# Patient Record
Sex: FEMALE | Race: BLACK OR AFRICAN AMERICAN | NOT HISPANIC OR LATINO | Employment: FULL TIME | URBAN - NONMETROPOLITAN AREA
[De-identification: names, ages, dates, MRNs, and addresses within clinical notes are randomized per-mention and may not be internally consistent; named-entity substitution may affect disease eponyms.]

---

## 2017-02-20 RX ORDER — BUTALBITAL, ACETAMINOPHEN AND CAFFEINE 50; 325; 40 MG/1; MG/1; MG/1
TABLET ORAL
Qty: 60 TABLET | Refills: 0 | OUTPATIENT
Start: 2017-02-20

## 2017-05-01 RX ORDER — METHOCARBAMOL 750 MG/1
TABLET, FILM COATED ORAL
Qty: 90 TABLET | Refills: 5 | Status: SHIPPED | OUTPATIENT
Start: 2017-05-01 | End: 2018-05-14

## 2017-05-11 ENCOUNTER — OFFICE VISIT (OUTPATIENT)
Dept: FAMILY MEDICINE CLINIC | Facility: CLINIC | Age: 42
End: 2017-05-11

## 2017-05-11 VITALS
BODY MASS INDEX: 27 KG/M2 | SYSTOLIC BLOOD PRESSURE: 110 MMHG | DIASTOLIC BLOOD PRESSURE: 78 MMHG | HEIGHT: 67 IN | WEIGHT: 172 LBS

## 2017-05-11 DIAGNOSIS — F41.9 ANXIETY: ICD-10-CM

## 2017-05-11 DIAGNOSIS — Z00.00 GENERAL MEDICAL EXAM: Primary | ICD-10-CM

## 2017-05-11 DIAGNOSIS — Z12.31 SCREENING MAMMOGRAM, ENCOUNTER FOR: ICD-10-CM

## 2017-05-11 DIAGNOSIS — R51.9 HEADACHE DISORDER: ICD-10-CM

## 2017-05-11 DIAGNOSIS — G47.09 OTHER INSOMNIA: ICD-10-CM

## 2017-05-11 PROBLEM — G47.00 INSOMNIA: Status: ACTIVE | Noted: 2017-05-11

## 2017-05-11 PROCEDURE — 99396 PREV VISIT EST AGE 40-64: CPT | Performed by: NURSE PRACTITIONER

## 2017-05-11 RX ORDER — ZOLPIDEM TARTRATE 5 MG/1
5 TABLET ORAL NIGHTLY PRN
Qty: 30 TABLET | Refills: 2 | Status: SHIPPED | OUTPATIENT
Start: 2017-05-11 | End: 2018-03-15 | Stop reason: SDUPTHER

## 2017-05-11 RX ORDER — NYSTATIN AND TRIAMCINOLONE ACETONIDE 100000; 1 [USP'U]/G; MG/G
OINTMENT TOPICAL 2 TIMES DAILY
Qty: 30 G | Refills: 5 | Status: SHIPPED | OUTPATIENT
Start: 2017-05-11 | End: 2018-09-20

## 2017-05-12 ENCOUNTER — APPOINTMENT (OUTPATIENT)
Dept: LAB | Facility: HOSPITAL | Age: 42
End: 2017-05-12

## 2017-05-12 LAB
25(OH)D3 SERPL-MCNC: 42.3 NG/ML (ref 30–100)
ALBUMIN SERPL-MCNC: 4.2 G/DL (ref 3.4–4.8)
ALBUMIN/GLOB SERPL: 1.2 G/DL (ref 1.1–1.8)
ALP SERPL-CCNC: 81 U/L (ref 38–126)
ALT SERPL W P-5'-P-CCNC: 21 U/L (ref 9–52)
ANION GAP SERPL CALCULATED.3IONS-SCNC: 10 MMOL/L (ref 5–15)
ARTICHOKE IGE QN: 92 MG/DL (ref 1–129)
AST SERPL-CCNC: 35 U/L (ref 14–36)
BASOPHILS # BLD AUTO: 0.04 10*3/MM3 (ref 0–0.2)
BASOPHILS NFR BLD AUTO: 0.6 % (ref 0–2)
BILIRUB SERPL-MCNC: 0.7 MG/DL (ref 0.2–1.3)
BUN BLD-MCNC: 13 MG/DL (ref 7–21)
BUN/CREAT SERPL: 13.7 (ref 7–25)
CALCIUM SPEC-SCNC: 9 MG/DL (ref 8.4–10.2)
CHLORIDE SERPL-SCNC: 105 MMOL/L (ref 95–110)
CHOLEST SERPL-MCNC: 178 MG/DL (ref 0–199)
CO2 SERPL-SCNC: 24 MMOL/L (ref 22–31)
CREAT BLD-MCNC: 0.95 MG/DL (ref 0.5–1)
DEPRECATED RDW RBC AUTO: 42.7 FL (ref 36.4–46.3)
EOSINOPHIL # BLD AUTO: 0.17 10*3/MM3 (ref 0–0.7)
EOSINOPHIL NFR BLD AUTO: 2.4 % (ref 0–7)
ERYTHROCYTE [DISTWIDTH] IN BLOOD BY AUTOMATED COUNT: 13.1 % (ref 11.5–14.5)
GFR SERPL CREATININE-BSD FRML MDRD: 77 ML/MIN/1.73 (ref 58–135)
GLOBULIN UR ELPH-MCNC: 3.6 GM/DL (ref 2.3–3.5)
GLUCOSE BLD-MCNC: 96 MG/DL (ref 60–100)
HBA1C MFR BLD: 5.43 % (ref 4–5.6)
HCT VFR BLD AUTO: 43.3 % (ref 35–45)
HDLC SERPL-MCNC: 59 MG/DL (ref 60–200)
HGB BLD-MCNC: 15 G/DL (ref 12–15.5)
IMM GRANULOCYTES # BLD: 0.01 10*3/MM3 (ref 0–0.02)
IMM GRANULOCYTES NFR BLD: 0.1 % (ref 0–0.5)
LDLC/HDLC SERPL: 1.76 {RATIO} (ref 0–3.22)
LYMPHOCYTES # BLD AUTO: 1.59 10*3/MM3 (ref 0.6–4.2)
LYMPHOCYTES NFR BLD AUTO: 22.3 % (ref 10–50)
MAGNESIUM SERPL-MCNC: 2.2 MG/DL (ref 1.6–2.3)
MCH RBC QN AUTO: 30.8 PG (ref 26.5–34)
MCHC RBC AUTO-ENTMCNC: 34.6 G/DL (ref 31.4–36)
MCV RBC AUTO: 88.9 FL (ref 80–98)
MONOCYTES # BLD AUTO: 0.58 10*3/MM3 (ref 0–0.9)
MONOCYTES NFR BLD AUTO: 8.1 % (ref 0–12)
NEUTROPHILS # BLD AUTO: 4.73 10*3/MM3 (ref 2–8.6)
NEUTROPHILS NFR BLD AUTO: 66.5 % (ref 37–80)
PLATELET # BLD AUTO: 248 10*3/MM3 (ref 150–450)
PMV BLD AUTO: 10.7 FL (ref 8–12)
POTASSIUM BLD-SCNC: 4 MMOL/L (ref 3.5–5.1)
PROT SERPL-MCNC: 7.8 G/DL (ref 6.3–8.6)
RBC # BLD AUTO: 4.87 10*6/MM3 (ref 3.77–5.16)
SODIUM BLD-SCNC: 139 MMOL/L (ref 137–145)
TRIGL SERPL-MCNC: 77 MG/DL (ref 20–199)
TSH SERPL DL<=0.05 MIU/L-ACNC: 0.78 MIU/ML (ref 0.46–4.68)
VIT B12 BLD-MCNC: 645 PG/ML (ref 239–931)
WBC NRBC COR # BLD: 7.12 10*3/MM3 (ref 3.2–9.8)

## 2017-05-12 PROCEDURE — 85025 COMPLETE CBC W/AUTO DIFF WBC: CPT | Performed by: NURSE PRACTITIONER

## 2017-05-12 PROCEDURE — 80053 COMPREHEN METABOLIC PANEL: CPT | Performed by: NURSE PRACTITIONER

## 2017-05-12 PROCEDURE — 80061 LIPID PANEL: CPT | Performed by: NURSE PRACTITIONER

## 2017-05-12 PROCEDURE — 83735 ASSAY OF MAGNESIUM: CPT | Performed by: NURSE PRACTITIONER

## 2017-05-12 PROCEDURE — 82306 VITAMIN D 25 HYDROXY: CPT | Performed by: NURSE PRACTITIONER

## 2017-05-12 PROCEDURE — 84443 ASSAY THYROID STIM HORMONE: CPT | Performed by: NURSE PRACTITIONER

## 2017-05-12 PROCEDURE — 83036 HEMOGLOBIN GLYCOSYLATED A1C: CPT | Performed by: NURSE PRACTITIONER

## 2017-05-12 PROCEDURE — 82607 VITAMIN B-12: CPT | Performed by: NURSE PRACTITIONER

## 2017-05-12 PROCEDURE — 36415 COLL VENOUS BLD VENIPUNCTURE: CPT | Performed by: NURSE PRACTITIONER

## 2017-06-20 RX ORDER — FLUCONAZOLE 150 MG/1
TABLET ORAL
Qty: 3 TABLET | Refills: 0 | Status: SHIPPED | OUTPATIENT
Start: 2017-06-20 | End: 2018-04-26 | Stop reason: SDUPTHER

## 2017-06-28 ENCOUNTER — TELEPHONE (OUTPATIENT)
Dept: FAMILY MEDICINE CLINIC | Facility: CLINIC | Age: 42
End: 2017-06-28

## 2017-06-28 NOTE — TELEPHONE ENCOUNTER
Marissa had left a message on my voice mail yesterday saying that she needed a refill on the cleocin ? That you usually give her for bacterial infections. The refills had  .

## 2017-07-03 ENCOUNTER — TELEPHONE (OUTPATIENT)
Dept: FAMILY MEDICINE CLINIC | Facility: CLINIC | Age: 42
End: 2017-07-03

## 2017-07-03 NOTE — TELEPHONE ENCOUNTER
[facility] HAS CALLED BACK ON PRESP FOR RINA MAYS..GIVEN 6/28..   CLINDAMYCIN SUPPOSITORIES WAS INSERT  2XDAY BUT THE QUANITY WAS FOR ONLY 3??PLEASE CALL PHARM BACK

## 2017-07-05 NOTE — TELEPHONE ENCOUNTER
[facility] CALLED AGAIN ON THE PRESP FOR RINA MAYS'S PRESP .   DOSEING ON IT WILL NOT GO THRU THE INSURANCE..THEY NEED TO SPEAK WITH YOU AGAIN TO GET THIS CHANGED

## 2017-08-11 RX ORDER — ELETRIPTAN HYDROBROMIDE 40 MG/1
TABLET, FILM COATED ORAL
Qty: 12 TABLET | Refills: 0 | Status: SHIPPED | OUTPATIENT
Start: 2017-08-11 | End: 2017-09-15 | Stop reason: SDUPTHER

## 2017-09-18 RX ORDER — ELETRIPTAN HYDROBROMIDE 40 MG/1
TABLET, FILM COATED ORAL
Qty: 12 TABLET | Refills: 5 | Status: SHIPPED | OUTPATIENT
Start: 2017-09-18 | End: 2018-03-15 | Stop reason: SDUPTHER

## 2017-11-15 RX ORDER — ALBUTEROL SULFATE 90 MCG
HFA AEROSOL WITH ADAPTER (GRAM) INHALATION
Qty: 6.7 G | Refills: 5 | Status: SHIPPED | OUTPATIENT
Start: 2017-11-15 | End: 2018-12-14 | Stop reason: SDUPTHER

## 2017-11-16 ENCOUNTER — TELEPHONE (OUTPATIENT)
Dept: FAMILY MEDICINE CLINIC | Facility: CLINIC | Age: 42
End: 2017-11-16

## 2017-11-27 ENCOUNTER — OFFICE VISIT (OUTPATIENT)
Dept: FAMILY MEDICINE CLINIC | Facility: CLINIC | Age: 42
End: 2017-11-27

## 2017-11-27 VITALS
SYSTOLIC BLOOD PRESSURE: 118 MMHG | DIASTOLIC BLOOD PRESSURE: 78 MMHG | WEIGHT: 183 LBS | OXYGEN SATURATION: 98 % | HEART RATE: 47 BPM | TEMPERATURE: 97.8 F | HEIGHT: 67 IN | BODY MASS INDEX: 28.72 KG/M2

## 2017-11-27 DIAGNOSIS — J06.9 ACUTE UPPER RESPIRATORY INFECTION: Primary | ICD-10-CM

## 2017-11-27 DIAGNOSIS — R51.9 HEADACHE DISORDER: ICD-10-CM

## 2017-11-27 PROCEDURE — 96372 THER/PROPH/DIAG INJ SC/IM: CPT | Performed by: NURSE PRACTITIONER

## 2017-11-27 PROCEDURE — 99213 OFFICE O/P EST LOW 20 MIN: CPT | Performed by: NURSE PRACTITIONER

## 2017-11-27 RX ORDER — FLUCONAZOLE 150 MG/1
150 TABLET ORAL ONCE
Qty: 2 TABLET | Refills: 0 | Status: SHIPPED | OUTPATIENT
Start: 2017-11-27 | End: 2017-11-27

## 2017-11-27 RX ORDER — PROMETHAZINE HYDROCHLORIDE AND CODEINE PHOSPHATE 6.25; 1 MG/5ML; MG/5ML
5 SYRUP ORAL EVERY 4 HOURS PRN
Qty: 180 ML | Refills: 0 | Status: SHIPPED | OUTPATIENT
Start: 2017-11-27 | End: 2018-03-15

## 2017-11-27 RX ORDER — CEFPROZIL 500 MG/1
500 TABLET, FILM COATED ORAL 2 TIMES DAILY
Qty: 20 TABLET | Refills: 0 | Status: SHIPPED | OUTPATIENT
Start: 2017-11-27 | End: 2018-03-15

## 2017-11-27 RX ORDER — TRIAMCINOLONE ACETONIDE 40 MG/ML
80 INJECTION, SUSPENSION INTRA-ARTICULAR; INTRAMUSCULAR ONCE
Status: COMPLETED | OUTPATIENT
Start: 2017-11-27 | End: 2017-11-27

## 2017-11-27 RX ORDER — TOPIRAMATE 100 MG/1
100 TABLET, FILM COATED ORAL 2 TIMES DAILY
Qty: 60 TABLET | Refills: 11 | Status: SHIPPED | OUTPATIENT
Start: 2017-11-27 | End: 2017-11-27

## 2017-11-27 RX ADMIN — TRIAMCINOLONE ACETONIDE 80 MG: 40 INJECTION, SUSPENSION INTRA-ARTICULAR; INTRAMUSCULAR at 15:21

## 2017-11-27 NOTE — PROGRESS NOTES
Chief Complaint   Patient presents with   • Cough   • Nasal Congestion   • Headache     Subjective   Marissa Bunch is a 42 y.o. female.     Cough   This is a new problem. The problem has been gradually worsening. The cough is productive of sputum. Associated symptoms include headaches, myalgias, nasal congestion, postnasal drip and rhinorrhea. Pertinent negatives include no chest pain, chills, ear congestion, ear pain, fever, heartburn, hemoptysis, rash, sore throat, shortness of breath, sweats, weight loss or wheezing. The treatment provided mild relief. Her past medical history is significant for bronchitis. There is no history of asthma, bronchiectasis, COPD, emphysema, environmental allergies or pneumonia.   Headache    This is a recurrent problem. The current episode started more than 1 year ago. The problem occurs intermittently. The problem has been gradually worsening. Associated symptoms include back pain, coughing, insomnia, neck pain, rhinorrhea and sinus pressure. Pertinent negatives include no abdominal pain, dizziness, ear pain, fever, hearing loss, nausea, numbness, photophobia, seizures, sore throat, swollen glands, tingling, visual change, vomiting, weakness or weight loss. She has tried acetaminophen, antidepressants, beta blockers, Excedrin, darkened room, cold packs, ergotamines, ketorolac injections, oral narcotics, oxygen and triptans for the symptoms. The treatment provided mild relief. There is no history of cancer, cluster headaches, hypertension, immunosuppression, migraine headaches, migraines in the family, obesity, pseudotumor cerebri, recent head traumas, sinus disease or TMJ.   Neck Pain    This is a recurrent problem. The current episode started 1 to 4 weeks ago. The problem occurs constantly. The problem has been rapidly worsening. The quality of the pain is described as burning. The pain is at a severity of 4/10. The symptoms are aggravated by position. The pain is worse  during the day. Associated symptoms include headaches. Pertinent negatives include no chest pain, fever, leg pain, numbness, pain with swallowing, paresis, photophobia, syncope, tingling, trouble swallowing, visual change, weakness or weight loss. She has tried acetaminophen, bed rest, chiropractic manipulation, heat, home exercises, ice, muscle relaxants, neck support, NSAIDs and oral narcotics for the symptoms. The treatment provided mild relief.   Insomnia   This is a recurrent problem. The current episode started 1 to 4 weeks ago. The problem occurs constantly. The problem has been gradually worsening. Associated symptoms include arthralgias, congestion, coughing, fatigue, headaches, myalgias and neck pain. Pertinent negatives include no abdominal pain, chest pain, chills, diaphoresis, fever, joint swelling, nausea, numbness, rash, sore throat, swollen glands, urinary symptoms, vertigo, visual change, vomiting or weakness. Treatments tried: ambien  The treatment provided mild relief.        The following portions of the patient's history were reviewed and updated as appropriate: allergies, current medications, past social history and problem list.    Review of Systems   Constitutional: Positive for fatigue. Negative for chills, diaphoresis, fever and weight loss.   HENT: Positive for congestion, postnasal drip, rhinorrhea, sinus pain and sinus pressure. Negative for dental problem, drooling, ear discharge, ear pain, facial swelling, hearing loss, sore throat and trouble swallowing.    Eyes: Negative.  Negative for photophobia.   Respiratory: Positive for cough. Negative for hemoptysis, shortness of breath and wheezing.    Cardiovascular: Negative.  Negative for chest pain and syncope.   Gastrointestinal: Negative.  Negative for abdominal pain, heartburn, nausea and vomiting.   Endocrine: Negative.    Genitourinary: Negative.    Musculoskeletal: Positive for arthralgias, back pain, myalgias, neck pain and neck  "stiffness. Negative for gait problem and joint swelling.   Skin: Negative.  Negative for rash.   Allergic/Immunologic: Negative.  Negative for environmental allergies.   Neurological: Positive for headaches. Negative for dizziness, vertigo, tingling, tremors, seizures, syncope, speech difficulty, weakness, light-headedness and numbness.   Hematological: Negative.  Negative for adenopathy. Does not bruise/bleed easily.   Psychiatric/Behavioral: Positive for agitation, behavioral problems and sleep disturbance. The patient is nervous/anxious and has insomnia.        Objective   /78 (BP Location: Right arm, Patient Position: Sitting, Cuff Size: Large Adult)  Pulse (!) 47  Temp 97.8 °F (36.6 °C)  Ht 67\" (170.2 cm)  Wt 183 lb (83 kg)  SpO2 98%  BMI 28.66 kg/m2  Physical Exam   Constitutional: She is oriented to person, place, and time. She appears well-developed and well-nourished.   HENT:   Head: Normocephalic and atraumatic.   Mouth/Throat: Oropharyngeal exudate present.   Eyes: Conjunctivae and EOM are normal. Pupils are equal, round, and reactive to light. Right eye exhibits no discharge. Left eye exhibits no discharge. No scleral icterus.   Neck: Normal range of motion. Neck supple.   Cardiovascular: Normal rate, regular rhythm, normal heart sounds and normal pulses.    Pulmonary/Chest: Effort normal. No respiratory distress. She has no wheezes. She has no rales. Chest wall is not dull to percussion. She exhibits no mass, no tenderness, no bony tenderness, no laceration, no crepitus, no edema, no deformity, no swelling and no retraction. Right breast exhibits no inverted nipple, no mass, no nipple discharge, no skin change and no tenderness. Left breast exhibits no inverted nipple, no mass, no nipple discharge, no skin change and no tenderness. Breasts are symmetrical. There is no breast swelling.   Abdominal: Soft. Bowel sounds are normal. She exhibits no distension. There is no tenderness. "   Genitourinary: Rectum normal and vagina normal. No breast tenderness, discharge or bleeding. Pelvic exam was performed with patient supine. Uterus is not deviated, not enlarged, not fixed and not tender. Cervix exhibits no motion tenderness, no discharge and no friability. Right adnexum displays no mass. Left adnexum displays no mass.   Musculoskeletal: Normal range of motion.   Neurological: She is alert and oriented to person, place, and time. She has normal reflexes. She displays normal reflexes. No cranial nerve deficit. She exhibits normal muscle tone. Coordination normal.   Skin: Skin is warm and dry.   Psychiatric: She has a normal mood and affect.   Nursing note and vitals reviewed.      Assessment/Plan   Problem List Items Addressed This Visit        Respiratory    Acute upper respiratory infection - Primary    Relevant Medications    cefprozil (CEFZIL) 500 MG tablet       Other    Headache disorder           New Medications Ordered This Visit   Medications   • promethazine-codeine (PHENERGAN with CODEINE) 6.25-10 MG/5ML syrup     Sig: Take 5 mL by mouth Every 4 (Four) Hours As Needed for Cough.     Dispense:  180 mL     Refill:  0   • fluconazole (DIFLUCAN) 150 MG tablet     Sig: Take 1 tablet by mouth 1 (One) Time for 1 dose.     Dispense:  2 tablet     Refill:  0   • cefprozil (CEFZIL) 500 MG tablet     Sig: Take 1 tablet by mouth 2 (Two) Times a Day.     Dispense:  20 tablet     Refill:  0      fluids, rest, meds as directed, diet discussed, follow up if worsen     It's not just what you eat, but when you eat  Eat breakfast, and eat smaller meals throughout the day. A healthy breakfast can jumpstart your metabolism, while eating small, healthy meals (rather than the standard three large meals) keeps your energy up.   Avoid eating at night. Try to eat dinner earlier and fast for 14-16 hours until breakfast the next morning. Studies suggest that eating only when you’re most active and giving your  digestive system a long break each day may help to regulate weight.

## 2017-12-15 RX ORDER — VERAPAMIL HYDROCHLORIDE 240 MG/1
TABLET, FILM COATED, EXTENDED RELEASE ORAL
Qty: 60 TABLET | Refills: 5 | Status: SHIPPED | OUTPATIENT
Start: 2017-12-15 | End: 2018-12-14 | Stop reason: SDUPTHER

## 2018-03-15 ENCOUNTER — OFFICE VISIT (OUTPATIENT)
Dept: FAMILY MEDICINE CLINIC | Facility: CLINIC | Age: 43
End: 2018-03-15

## 2018-03-15 ENCOUNTER — PRIOR AUTHORIZATION (OUTPATIENT)
Dept: FAMILY MEDICINE CLINIC | Facility: CLINIC | Age: 43
End: 2018-03-15

## 2018-03-15 VITALS
BODY MASS INDEX: 28.56 KG/M2 | SYSTOLIC BLOOD PRESSURE: 120 MMHG | WEIGHT: 182 LBS | DIASTOLIC BLOOD PRESSURE: 82 MMHG | HEIGHT: 67 IN

## 2018-03-15 DIAGNOSIS — F51.01 PRIMARY INSOMNIA: ICD-10-CM

## 2018-03-15 DIAGNOSIS — N62 LARGE BREASTS: ICD-10-CM

## 2018-03-15 DIAGNOSIS — M54.2 CERVICAL PAIN (NECK): Primary | ICD-10-CM

## 2018-03-15 DIAGNOSIS — R51.9 HEADACHE DISORDER: ICD-10-CM

## 2018-03-15 PROCEDURE — 99213 OFFICE O/P EST LOW 20 MIN: CPT | Performed by: NURSE PRACTITIONER

## 2018-03-15 RX ORDER — ZOLPIDEM TARTRATE 5 MG/1
5 TABLET ORAL NIGHTLY PRN
Qty: 30 TABLET | Refills: 2 | Status: SHIPPED | OUTPATIENT
Start: 2018-03-15 | End: 2019-01-30 | Stop reason: DRUGHIGH

## 2018-03-15 RX ORDER — BUTALBITAL, ACETAMINOPHEN AND CAFFEINE 50; 325; 40 MG/1; MG/1; MG/1
1 TABLET ORAL EVERY 4 HOURS PRN
Qty: 60 TABLET | Refills: 0 | Status: SHIPPED | OUTPATIENT
Start: 2018-03-15 | End: 2019-01-30

## 2018-03-15 RX ORDER — ELETRIPTAN HYDROBROMIDE 40 MG/1
40 TABLET, FILM COATED ORAL DAILY PRN
Qty: 12 TABLET | Refills: 11 | Status: SHIPPED | OUTPATIENT
Start: 2018-03-15 | End: 2019-03-04 | Stop reason: SDUPTHER

## 2018-03-15 NOTE — PROGRESS NOTES
Chief Complaint   Patient presents with   • Follow-up     headches    • Neck Pain   • Med Refill     ambien and butal     Subjective   Marissa Bunch is a 42 y.o. female.     prsents with neck pain for 5 years or longer has tried pt, chiropractor multiple meds, otc meds -headache and neckpain is constant now has numbness in right arm. Symptoms are worsening by large size of breast tissue-now pain is constant-breast tissue is very heavy -mammogram utd needs mri c spine       Neck Pain    This is a recurrent problem. The current episode started more than 1 year ago. The problem occurs daily. The problem has been rapidly worsening. The pain is associated with nothing. The pain is present in the anterior neck, left side and midline. The quality of the pain is described as shooting. The pain is at a severity of 10/10. The pain is moderate. The symptoms are aggravated by position and twisting. The pain is same all the time. Stiffness is present all day. Associated symptoms include headaches, numbness and paresis. Pertinent negatives include no chest pain, fever, leg pain, pain with swallowing, photophobia, syncope, tingling, trouble swallowing, visual change, weakness or weight loss. She has tried acetaminophen, bed rest, home exercises, heat, ice, muscle relaxants, neck support, NSAIDs and oral narcotics (pt, chiroptractor meds ) for the symptoms.   Insomnia   This is a chronic problem. The current episode started more than 1 year ago. The problem occurs constantly. The problem has been gradually improving. Associated symptoms include arthralgias, headaches, myalgias, neck pain and numbness. Pertinent negatives include no abdominal pain, change in bowel habit, chest pain, chills, congestion, fatigue, fever, joint swelling, nausea, rash, sore throat, swollen glands, urinary symptoms, visual change, vomiting or weakness. Nothing aggravates the symptoms. Treatments tried: ambien helps  The treatment provided moderate  "relief.        The following portions of the patient's history were reviewed and updated as appropriate: allergies, current medications, past social history and problem list.    Review of Systems   Constitutional: Negative.  Negative for chills, fatigue, fever and weight loss.   HENT: Negative for congestion, sore throat and trouble swallowing.    Eyes: Negative.  Negative for photophobia.   Respiratory: Negative.    Cardiovascular: Negative.  Negative for chest pain, palpitations, leg swelling and syncope.   Gastrointestinal: Negative.  Negative for abdominal pain, change in bowel habit, nausea and vomiting.   Endocrine: Negative.  Negative for cold intolerance, heat intolerance and polydipsia.   Genitourinary: Negative.  Negative for dyspareunia.   Musculoskeletal: Positive for arthralgias, back pain, myalgias, neck pain and neck stiffness. Negative for joint swelling.   Skin: Negative for rash.   Allergic/Immunologic: Negative.  Negative for environmental allergies, food allergies and immunocompromised state.   Neurological: Positive for numbness and headaches. Negative for tingling, tremors, facial asymmetry, weakness and light-headedness.        Numbness right arm constantly     Hematological: Negative.  Negative for adenopathy. Does not bruise/bleed easily.   Psychiatric/Behavioral: Positive for sleep disturbance. Negative for behavioral problems and confusion. The patient is nervous/anxious and has insomnia.    All other systems reviewed and are negative.      Objective   /82   Ht 170.2 cm (67\")   Wt 82.6 kg (182 lb)   BMI 28.51 kg/m²   Physical Exam   Constitutional: She is oriented to person, place, and time. She appears well-developed and well-nourished.   HENT:   Head: Normocephalic and atraumatic.   Eyes: EOM are normal. Pupils are equal, round, and reactive to light. Right eye exhibits no discharge. Left eye exhibits no discharge. No scleral icterus.   Neck: Normal range of motion. Neck supple. "   Cardiovascular: Normal rate and regular rhythm.    Pulmonary/Chest: Effort normal and breath sounds normal. No respiratory distress. She has no wheezes. She has no rales. She exhibits no tenderness.   Large breast heavy tissue bilateral-causing indentations of bra strap line    Abdominal: Soft. Bowel sounds are normal.   Musculoskeletal: She exhibits edema and tenderness.        Cervical back: She exhibits decreased range of motion, tenderness, bony tenderness, pain and spasm. She exhibits no swelling, no edema, no deformity, no laceration and normal pulse.        Left upper arm: She exhibits tenderness, bony tenderness and swelling. She exhibits no edema, no deformity and no laceration.   Large breast tissue-indentations  bilateral shoulders from bra line causing pressure and muscle tension around the indentations     Neurological: She is alert and oriented to person, place, and time. She has normal reflexes. She displays normal reflexes. No cranial nerve deficit. She exhibits normal muscle tone. Coordination normal.   Skin: Skin is warm. Capillary refill takes less than 2 seconds. No rash noted. No erythema. No pallor.   Nursing note and vitals reviewed.  Study Result     PROCEDURE: MRI cervical spine without contrast     COMPARISON: No comparison     HISTORY: Abnormal cervical spine x-ray. Cervicalgia     TECHNIQUE: Multisequence multiplanar MR imaging of the cervical  spine is obtained without intravenous gadolinium.     FINDINGS:  The visualized posterior fossa is unremarkable. Spinal cord  demonstrates normal signal and contour.     There is mild reversal of normal cervical lordosis. The vertebral  body heights and alignment are otherwise intact. The marrow  signal is within normal limits. There is mild disc space  narrowing at C4-5. The remaining intervertebral disc spaces are  well-preserved.     C2-C3: No significant disc herniation, spinal canal stenosis or  neural foraminal narrowing     C3-C4: No  significant disc herniation, spinal canal stenosis or  neural foraminal narrowing     C4-C5: Disc osteophyte complex resulting in mild indentation of  the anterior thecal sac. The neural foramina are adequately  patent     C5-C6: Disc osteophyte complex resulting in mild indentation of  the anterior thecal sac. The neural foramina are adequately  patent     C6-C7: Disc osteophyte complex resulting in mild indentation of  the anterior thecal sac. The neural foramina are adequately  patent     C7-T1: No significant disc herniation, spinal canal stenosis or  neural foraminal narrowing     IMPRESSION:  1. Minimal degenerative changes involving the cervical spine. No  significant spinal canal stenosis or neural foraminal narrowing.  2. Mild reversal of the normal cervical lordosis which may be  positional or related to muscle strain/spasm.     Electronically signed by:  Robert Martines MD  3/20/2018 10:42  AM CDT Workstation: [id number]         Assessment/Plan   Problem List Items Addressed This Visit        Nervous and Auditory    Cervical pain (neck) - Primary    Relevant Orders    CBC & Differential    Comprehensive Metabolic Panel    Iron    Lipid Panel    Vitamin D 25 Hydroxy    Vitamin B12    TSH    Magnesium    MRI Cervical Spine Without Contrast (Completed)       Other    Headache disorder    Relevant Medications    butalbital-acetaminophen-caffeine (ESGIC) -40 MG per tablet    eletriptan (RELPAX) 40 MG tablet    Other Relevant Orders    CBC & Differential    Comprehensive Metabolic Panel    Iron    Lipid Panel    Vitamin D 25 Hydroxy    Vitamin B12    TSH    Magnesium    Insomnia    Relevant Orders    CBC & Differential    Comprehensive Metabolic Panel    Iron    Lipid Panel    Vitamin D 25 Hydroxy    Vitamin B12    TSH    Magnesium    Large breasts      Other Visit Diagnoses    None.          New Medications Ordered This Visit   Medications   • zolpidem (AMBIEN) 5 MG tablet     Sig: Take 1 tablet  by mouth At Night As Needed for Sleep.     Dispense:  30 tablet     Refill:  2   • butalbital-acetaminophen-caffeine (ESGIC) -40 MG per tablet     Sig: Take 1 tablet by mouth Every 4 (Four) Hours As Needed for Headache.     Dispense:  60 tablet     Refill:  0   • eletriptan (RELPAX) 40 MG tablet     Sig: Take 1 tablet by mouth Daily As Needed for Headache or Migraine. may repeat in 2 hours if necessary     Dispense:  12 tablet     Refill:  11        It's not just what you eat, but when you eat  Eat breakfast, and eat smaller meals throughout the day. A healthy breakfast can jumpstart your metabolism, while eating small, healthy meals (rather than the standard three large meals) keeps your energy up.   Avoid eating at night. Try to eat dinner earlier and fast for 14-16 hours until breakfast the next morning. Studies suggest that eating only when you’re most active and giving your digestive system a long break each day may help to regulate weight.     Patient understands the risks associated with this controlled medication, including tolerance and addiction.  she also agrees to only obtain this medication from me, and not from a another provider, unless that provider is covering for me in my absence.  she also agrees to be compliant in dosing, and not self adjust the dose of medication.  A signed controlled substance agreement is on file, and she has received a controlled substance education sheet at this a previous visit.  she has also signed a consent for treatment with a controlled substance as per [facility] policy. OLYA was obtained.    Patient has failed on outpatient therapy and all conservative measures would benefit from an cervical mri.     Patient would benefit from a breast reduction due to above issues     Current outpatient and discharge medications have been reconciled for the patient.  JOSE Sharif  Current outpatient and discharge medications have been reconciled for the  patient.  Shruthi Johnson, APRN

## 2018-03-20 ENCOUNTER — HOSPITAL ENCOUNTER (OUTPATIENT)
Dept: MRI IMAGING | Facility: HOSPITAL | Age: 43
Discharge: HOME | End: 2018-03-20
Admitting: NURSE PRACTITIONER

## 2018-03-20 DIAGNOSIS — M54.2 CERVICAL PAIN (NECK): ICD-10-CM

## 2018-03-20 PROCEDURE — 72141 MRI NECK SPINE W/O DYE: CPT

## 2018-03-21 DIAGNOSIS — N62 LARGE BREASTS: Primary | ICD-10-CM

## 2018-04-26 RX ORDER — FLUCONAZOLE 150 MG/1
TABLET ORAL
Qty: 2 TABLET | Refills: 5 | Status: SHIPPED | OUTPATIENT
Start: 2018-04-26 | End: 2019-03-27

## 2018-05-14 ENCOUNTER — OFFICE VISIT (OUTPATIENT)
Dept: FAMILY MEDICINE CLINIC | Facility: CLINIC | Age: 43
End: 2018-05-14

## 2018-05-14 ENCOUNTER — APPOINTMENT (OUTPATIENT)
Dept: LAB | Facility: HOSPITAL | Age: 43
End: 2018-05-14

## 2018-05-14 ENCOUNTER — TELEPHONE (OUTPATIENT)
Dept: FAMILY MEDICINE CLINIC | Facility: CLINIC | Age: 43
End: 2018-05-14

## 2018-05-14 VITALS
HEIGHT: 67 IN | WEIGHT: 188 LBS | BODY MASS INDEX: 29.51 KG/M2 | SYSTOLIC BLOOD PRESSURE: 110 MMHG | DIASTOLIC BLOOD PRESSURE: 70 MMHG

## 2018-05-14 DIAGNOSIS — N62 LARGE BREASTS: ICD-10-CM

## 2018-05-14 DIAGNOSIS — Z00.00 GENERAL MEDICAL EXAM: ICD-10-CM

## 2018-05-14 DIAGNOSIS — Z12.31 ENCOUNTER FOR SCREENING MAMMOGRAM FOR MALIGNANT NEOPLASM OF BREAST: ICD-10-CM

## 2018-05-14 DIAGNOSIS — Z01.419 GYNECOLOGIC EXAM NORMAL: Primary | ICD-10-CM

## 2018-05-14 DIAGNOSIS — M54.2 CERVICAL PAIN (NECK): ICD-10-CM

## 2018-05-14 DIAGNOSIS — B37.31 YEAST VAGINITIS: ICD-10-CM

## 2018-05-14 LAB
25(OH)D3 SERPL-MCNC: 45.3 NG/ML (ref 30–100)
ALBUMIN SERPL-MCNC: 4 G/DL (ref 3.4–4.8)
ALBUMIN/GLOB SERPL: 1.2 G/DL (ref 1.1–1.8)
ALP SERPL-CCNC: 61 U/L (ref 38–126)
ALT SERPL W P-5'-P-CCNC: 32 U/L (ref 9–52)
ANION GAP SERPL CALCULATED.3IONS-SCNC: 10 MMOL/L (ref 5–15)
ARTICHOKE IGE QN: 81 MG/DL (ref 1–129)
AST SERPL-CCNC: 20 U/L (ref 14–36)
BASOPHILS # BLD AUTO: 0.03 10*3/MM3 (ref 0–0.2)
BASOPHILS NFR BLD AUTO: 0.4 % (ref 0–2)
BILIRUB SERPL-MCNC: 0.1 MG/DL (ref 0.2–1.3)
BUN BLD-MCNC: 11 MG/DL (ref 7–21)
BUN/CREAT SERPL: 15.9 (ref 7–25)
CALCIUM SPEC-SCNC: 9.2 MG/DL (ref 8.4–10.2)
CANDIDA ALBICANS: POSITIVE
CHLORIDE SERPL-SCNC: 102 MMOL/L (ref 95–110)
CHOLEST SERPL-MCNC: 167 MG/DL (ref 0–199)
CO2 SERPL-SCNC: 28 MMOL/L (ref 22–31)
CREAT BLD-MCNC: 0.69 MG/DL (ref 0.5–1)
DEPRECATED RDW RBC AUTO: 43.4 FL (ref 36.4–46.3)
EOSINOPHIL # BLD AUTO: 0.23 10*3/MM3 (ref 0–0.7)
EOSINOPHIL NFR BLD AUTO: 2.7 % (ref 0–7)
ERYTHROCYTE [DISTWIDTH] IN BLOOD BY AUTOMATED COUNT: 13.1 % (ref 11.5–14.5)
GARDNERELLA VAGINALIS: NEGATIVE
GFR SERPL CREATININE-BSD FRML MDRD: 112 ML/MIN/1.73 (ref 58–135)
GLOBULIN UR ELPH-MCNC: 3.4 GM/DL (ref 2.3–3.5)
GLUCOSE BLD-MCNC: 83 MG/DL (ref 60–100)
HCT VFR BLD AUTO: 42.8 % (ref 35–45)
HDLC SERPL-MCNC: 56 MG/DL (ref 60–200)
HGB BLD-MCNC: 14.6 G/DL (ref 12–15.5)
IMM GRANULOCYTES # BLD: 0.03 10*3/MM3 (ref 0–0.02)
IMM GRANULOCYTES NFR BLD: 0.4 % (ref 0–0.5)
IRON 24H UR-MRATE: 47 MCG/DL (ref 37–170)
LDLC/HDLC SERPL: 1.71 {RATIO} (ref 0–3.22)
LYMPHOCYTES # BLD AUTO: 1.73 10*3/MM3 (ref 0.6–4.2)
LYMPHOCYTES NFR BLD AUTO: 20.6 % (ref 10–50)
MAGNESIUM SERPL-MCNC: 2 MG/DL (ref 1.6–2.3)
MCH RBC QN AUTO: 31.4 PG (ref 26.5–34)
MCHC RBC AUTO-ENTMCNC: 34.1 G/DL (ref 31.4–36)
MCV RBC AUTO: 92 FL (ref 80–98)
MONOCYTES # BLD AUTO: 0.69 10*3/MM3 (ref 0–0.9)
MONOCYTES NFR BLD AUTO: 8.2 % (ref 0–12)
NEUTROPHILS # BLD AUTO: 5.7 10*3/MM3 (ref 2–8.6)
NEUTROPHILS NFR BLD AUTO: 67.7 % (ref 37–80)
PLATELET # BLD AUTO: 215 10*3/MM3 (ref 150–450)
PMV BLD AUTO: 10.6 FL (ref 8–12)
POTASSIUM BLD-SCNC: 3.6 MMOL/L (ref 3.5–5.1)
PROT SERPL-MCNC: 7.4 G/DL (ref 6.3–8.6)
RBC # BLD AUTO: 4.65 10*6/MM3 (ref 3.77–5.16)
SODIUM BLD-SCNC: 140 MMOL/L (ref 137–145)
TRICHOMONAS VAGINALIS PCR: NEGATIVE
TRIGL SERPL-MCNC: 77 MG/DL (ref 20–199)
TSH SERPL DL<=0.05 MIU/L-ACNC: 1.24 MIU/ML (ref 0.46–4.68)
VIT B12 BLD-MCNC: 456 PG/ML (ref 239–931)
WBC NRBC COR # BLD: 8.41 10*3/MM3 (ref 3.2–9.8)

## 2018-05-14 PROCEDURE — 83540 ASSAY OF IRON: CPT | Performed by: NURSE PRACTITIONER

## 2018-05-14 PROCEDURE — 85025 COMPLETE CBC W/AUTO DIFF WBC: CPT | Performed by: NURSE PRACTITIONER

## 2018-05-14 PROCEDURE — G0123 SCREEN CERV/VAG THIN LAYER: HCPCS | Performed by: NURSE PRACTITIONER

## 2018-05-14 PROCEDURE — 87480 CANDIDA DNA DIR PROBE: CPT | Performed by: NURSE PRACTITIONER

## 2018-05-14 PROCEDURE — 36415 COLL VENOUS BLD VENIPUNCTURE: CPT | Performed by: NURSE PRACTITIONER

## 2018-05-14 PROCEDURE — 80053 COMPREHEN METABOLIC PANEL: CPT | Performed by: NURSE PRACTITIONER

## 2018-05-14 PROCEDURE — 87660 TRICHOMONAS VAGIN DIR PROBE: CPT | Performed by: NURSE PRACTITIONER

## 2018-05-14 PROCEDURE — 83735 ASSAY OF MAGNESIUM: CPT | Performed by: NURSE PRACTITIONER

## 2018-05-14 PROCEDURE — 80061 LIPID PANEL: CPT | Performed by: NURSE PRACTITIONER

## 2018-05-14 PROCEDURE — 87510 GARDNER VAG DNA DIR PROBE: CPT | Performed by: NURSE PRACTITIONER

## 2018-05-14 PROCEDURE — 90715 TDAP VACCINE 7 YRS/> IM: CPT | Performed by: NURSE PRACTITIONER

## 2018-05-14 PROCEDURE — 90471 IMMUNIZATION ADMIN: CPT | Performed by: NURSE PRACTITIONER

## 2018-05-14 PROCEDURE — 82306 VITAMIN D 25 HYDROXY: CPT | Performed by: NURSE PRACTITIONER

## 2018-05-14 PROCEDURE — 99396 PREV VISIT EST AGE 40-64: CPT | Performed by: NURSE PRACTITIONER

## 2018-05-14 PROCEDURE — 84443 ASSAY THYROID STIM HORMONE: CPT | Performed by: NURSE PRACTITIONER

## 2018-05-14 PROCEDURE — 82607 VITAMIN B-12: CPT | Performed by: NURSE PRACTITIONER

## 2018-05-14 RX ORDER — FLUCONAZOLE 150 MG/1
TABLET ORAL
Qty: 2 TABLET | Refills: 1 | Status: SHIPPED | OUTPATIENT
Start: 2018-05-14 | End: 2018-09-20 | Stop reason: SDUPTHER

## 2018-05-14 RX ORDER — METAXALONE 800 MG/1
800 TABLET ORAL 3 TIMES DAILY PRN
Qty: 90 TABLET | Refills: 5 | Status: SHIPPED | OUTPATIENT
Start: 2018-05-14 | End: 2019-01-30

## 2018-05-14 NOTE — TELEPHONE ENCOUNTER
-Pr JOSE Basurto, Ms. Bunch has been called with her recent lab results & recommendations.  Continue her current medications and follow-up as planned or sooner if any problems.      ---- Message from JOSE Sharif sent at 5/14/2018  4:37 PM CDT -----  I called and left a message-see if you can get a hold of her-called in diflucan for 2 days-it may make her pap off-hopefully not-I sent it to [facility]

## 2018-05-14 NOTE — PROGRESS NOTES
Chief Complaint   Patient presents with   • Annual Exam     pap smear and mammogram     Subjective   Marissa Bunch is a 43 y.o. female.     prsents with neck pain for 5 years or longer has tried pt, chiropractor multiple meds, otc meds -headache and neckpain is constant now has numbness in right arm. Symptoms are worsening by large size of breast tissue-now pain is constant-breast tissue is very heavy -needs mammogram       Neck Pain    This is a recurrent problem. The current episode started more than 1 year ago. The problem occurs daily. The problem has been rapidly worsening. The pain is associated with nothing. The pain is present in the anterior neck, left side and midline. The quality of the pain is described as shooting. The pain is at a severity of 10/10. The pain is moderate. The symptoms are aggravated by position and twisting. The pain is same all the time. Stiffness is present all day. Associated symptoms include headaches, numbness and paresis. Pertinent negatives include no chest pain, fever, leg pain, pain with swallowing, photophobia, syncope, tingling, trouble swallowing, visual change, weakness or weight loss. She has tried acetaminophen, bed rest, home exercises, heat, ice, muscle relaxants, neck support, NSAIDs and oral narcotics (pt, chiroptractor meds ) for the symptoms.   Insomnia   This is a chronic problem. The current episode started more than 1 year ago. The problem occurs constantly. The problem has been gradually improving. Associated symptoms include arthralgias, headaches, myalgias, neck pain and numbness. Pertinent negatives include no abdominal pain, change in bowel habit, chest pain, chills, congestion, coughing, fatigue, fever, joint swelling, nausea, rash, sore throat, swollen glands, urinary symptoms, visual change, vomiting or weakness. Nothing aggravates the symptoms. Treatments tried: ambien helps  The treatment provided moderate relief.   Gynecologic Exam   The  patient's primary symptoms include vaginal discharge. The patient's pertinent negatives include no genital itching, genital lesions, genital odor, genital rash, missed menses, pelvic pain or vaginal bleeding. The problem occurs intermittently. Associated symptoms include back pain and headaches. Pertinent negatives include no abdominal pain, chills, constipation, diarrhea, dysuria, fever, flank pain, frequency, hematuria, nausea, rash, sore throat, urgency or vomiting. The vaginal bleeding is typical of menses. She has not been passing clots. She has not been passing tissue. Treatments tried: prescription cream  The treatment provided no relief. She is sexually active.        The following portions of the patient's history were reviewed and updated as appropriate: allergies, current medications, past social history and problem list.    Review of Systems   Constitutional: Positive for activity change and appetite change. Negative for chills, fatigue, fever and weight loss.   HENT: Negative for congestion, dental problem, sore throat and trouble swallowing.    Eyes: Negative.  Negative for photophobia, pain and redness.   Respiratory: Negative.  Negative for apnea, cough, choking, chest tightness, shortness of breath and stridor.    Cardiovascular: Negative.  Negative for chest pain, palpitations, leg swelling and syncope.   Gastrointestinal: Negative.  Negative for abdominal distention, abdominal pain, anal bleeding, blood in stool, change in bowel habit, constipation, diarrhea, nausea, rectal pain and vomiting.   Endocrine: Negative.  Negative for cold intolerance, heat intolerance, polydipsia, polyphagia and polyuria.   Genitourinary: Positive for vaginal discharge. Negative for decreased urine volume, difficulty urinating, dyspareunia, dysuria, enuresis, flank pain, frequency, genital sores, hematuria, menstrual problem, missed menses, pelvic pain, urgency, vaginal bleeding and vaginal pain.   Musculoskeletal:  "Positive for arthralgias, back pain, myalgias, neck pain and neck stiffness. Negative for joint swelling.   Skin: Negative for rash.   Allergic/Immunologic: Negative.  Negative for environmental allergies, food allergies and immunocompromised state.   Neurological: Positive for numbness and headaches. Negative for dizziness, tingling, tremors, seizures, facial asymmetry, weakness and light-headedness.        Numbness right arm constantly     Hematological: Negative.  Negative for adenopathy. Does not bruise/bleed easily.   Psychiatric/Behavioral: Positive for sleep disturbance. Negative for behavioral problems and confusion. The patient is nervous/anxious and has insomnia.    All other systems reviewed and are negative.      Objective   /70   Ht 170.2 cm (67\")   Wt 85.3 kg (188 lb)   BMI 29.44 kg/m²   Physical Exam   Constitutional: She is oriented to person, place, and time. She appears well-developed and well-nourished. She is not intubated.   HENT:   Head: Normocephalic and atraumatic.   Eyes: EOM are normal. Pupils are equal, round, and reactive to light. Right eye exhibits no discharge. Left eye exhibits no discharge. No scleral icterus.   Neck: Normal range of motion. Neck supple. No JVD present. No tracheal deviation present. No thyromegaly present.   Cardiovascular: Normal rate, regular rhythm and normal heart sounds.  Exam reveals no gallop and no friction rub.    No murmur heard.  Pulmonary/Chest: Effort normal and breath sounds normal. No accessory muscle usage or stridor. No apnea, no tachypnea and no bradypnea. She is not intubated. No respiratory distress. She has no wheezes. She has no rales. Chest wall is not dull to percussion. She exhibits no mass, no tenderness, no bony tenderness, no laceration, no crepitus, no edema, no deformity, no swelling and no retraction. Right breast exhibits no inverted nipple, no mass, no nipple discharge, no skin change and no tenderness. Left breast exhibits " no inverted nipple, no mass, no nipple discharge, no skin change and no tenderness. Breasts are symmetrical. There is no breast swelling.   Large breast heavy tissue bilateral-causing indentations of bra strap line    Abdominal: Soft. Bowel sounds are normal. She exhibits no distension and no mass. There is no tenderness. There is no rebound and no guarding. No hernia. Hernia confirmed negative in the right inguinal area and confirmed negative in the left inguinal area.   Genitourinary: Rectal exam shows no external hemorrhoid, no internal hemorrhoid, no fissure, no mass, no tenderness, anal tone normal and guaiac negative stool. No breast tenderness, discharge or bleeding. No labial fusion. There is no rash, tenderness, lesion or injury on the right labia. There is no rash, tenderness, lesion or injury on the left labia. Uterus is not deviated, not enlarged, not fixed and not tender. Cervix exhibits no motion tenderness, no discharge and no friability. Right adnexum displays no mass, no tenderness and no fullness. Left adnexum displays no mass, no tenderness and no fullness. No erythema, tenderness or bleeding in the vagina. No foreign body in the vagina. No signs of injury around the vagina. Vaginal discharge found.   Genitourinary Comments: Large amount of discharge present-hard time visualizing the cervical os due to discharge-sample taken    Musculoskeletal: She exhibits edema and tenderness.        Cervical back: She exhibits decreased range of motion, tenderness, bony tenderness, pain and spasm. She exhibits no swelling, no edema, no deformity, no laceration and normal pulse.        Left upper arm: She exhibits tenderness, bony tenderness and swelling. She exhibits no edema, no deformity and no laceration.   Large breast tissue-indentations  bilateral shoulders from bra line causing pressure and muscle tension around the indentations     Lymphadenopathy:     She has no cervical adenopathy.        Right: No  inguinal adenopathy present.        Left: No inguinal adenopathy present.   Neurological: She is alert and oriented to person, place, and time. She has normal reflexes. She displays normal reflexes. No cranial nerve deficit. She exhibits normal muscle tone. Coordination normal.   Skin: Skin is warm. Capillary refill takes less than 2 seconds. No rash noted. No erythema. No pallor.   Nursing note and vitals reviewed.      Assessment/Plan   Problem List Items Addressed This Visit        Nervous and Auditory    Cervical pain (neck)       Genitourinary    Gynecologic exam normal - Primary    Relevant Orders    Liquid-based Pap Smear, Screening    CBC & Differential (Completed)    Comprehensive Metabolic Panel (Completed)    Iron (Completed)    Vitamin D 25 Hydroxy (Completed)    Vitamin B12 (Completed)    TSH (Completed)    Magnesium (Completed)    Lipid Panel (Completed)    Gardnerella vaginalis, Trichomonas vaginalis, Candida albicans, PCR - Swab, Vagina (Completed)    CBC Auto Differential (Completed)    Yeast vaginitis    Relevant Medications    fluconazole (DIFLUCAN) 150 MG tablet       Other    General medical exam    Relevant Orders    CBC & Differential (Completed)    Comprehensive Metabolic Panel (Completed)    Iron (Completed)    Vitamin D 25 Hydroxy (Completed)    Vitamin B12 (Completed)    TSH (Completed)    Magnesium (Completed)    Lipid Panel (Completed)    CBC Auto Differential (Completed)    Encounter for screening mammogram for malignant neoplasm of breast    Relevant Orders    Mammo Screening Digital Tomosynthesis Bilateral With CAD    CBC & Differential (Completed)    Comprehensive Metabolic Panel (Completed)    Iron (Completed)    Vitamin D 25 Hydroxy (Completed)    Vitamin B12 (Completed)    TSH (Completed)    Magnesium (Completed)    Lipid Panel (Completed)    CBC Auto Differential (Completed)    Large breasts      Other Visit Diagnoses    None.          New Medications Ordered This Visit    Medications   • metaxalone (SKELAXIN) 800 MG tablet     Sig: Take 1 tablet by mouth 3 (Three) Times a Day As Needed for Muscle Spasms.     Dispense:  90 tablet     Refill:  5   • fluconazole (DIFLUCAN) 150 MG tablet     Sig: Take 1 today and tomorrow     Dispense:  2 tablet     Refill:  1      labs today, meds as directed, will notify with results

## 2018-05-14 NOTE — PROGRESS NOTES
JOSE Verduzco, Ms. Bunch has been called with her recent lab results & recommendations.  Continue her current medications and follow-up as planned or sooner if any problems.

## 2018-05-16 ENCOUNTER — TELEPHONE (OUTPATIENT)
Dept: FAMILY MEDICINE CLINIC | Facility: CLINIC | Age: 43
End: 2018-05-16

## 2018-05-16 LAB
LAB AP CASE REPORT: NORMAL
LAB AP GYN ADDITIONAL INFORMATION: NORMAL
LAB AP GYN OTHER FINDINGS: NORMAL
Lab: NORMAL
PATH INTERP SPEC-IMP: NORMAL
STAT OF ADQ CVX/VAG CYTO-IMP: NORMAL

## 2018-05-16 NOTE — PROGRESS NOTES
JOSE Verduzco, Ms. Bunch has been called with her recent Pap Smear results & recommendations.  Continue her current medications and follow-up as planned or sooner if any problems.

## 2018-05-16 NOTE — TELEPHONE ENCOUNTER
JOSE Verduzco, Ms. Bunch has been called with her recent Pap Smear results & recommendations.  Continue her current medications and follow-up as planned or sooner if any problems.    ----- Message from JOSE Sharif sent at 5/16/2018 10:01 AM CDT -----  Can you let her know normal pap-did show the yeast -I went ahead and treated her for it.

## 2018-06-04 RX ORDER — ZOLPIDEM TARTRATE 5 MG/1
TABLET ORAL
Qty: 30 TABLET | Refills: 0 | OUTPATIENT
Start: 2018-06-04

## 2018-07-11 RX ORDER — CLINDAMYCIN PHOSPHATE 100 MG/1
SUPPOSITORY VAGINAL
Qty: 3 SUPPOSITORY | Refills: 0 | Status: SHIPPED | OUTPATIENT
Start: 2018-07-11 | End: 2018-09-20

## 2018-08-08 RX ORDER — CLINDAMYCIN PHOSPHATE 100 MG/1
SUPPOSITORY VAGINAL
Qty: 3 SUPPOSITORY | Refills: 0 | OUTPATIENT
Start: 2018-08-08

## 2018-09-20 ENCOUNTER — OFFICE VISIT (OUTPATIENT)
Dept: FAMILY MEDICINE CLINIC | Facility: CLINIC | Age: 43
End: 2018-09-20

## 2018-09-20 VITALS
HEIGHT: 67 IN | BODY MASS INDEX: 28.88 KG/M2 | SYSTOLIC BLOOD PRESSURE: 118 MMHG | WEIGHT: 184 LBS | DIASTOLIC BLOOD PRESSURE: 80 MMHG

## 2018-09-20 DIAGNOSIS — R39.11 URINARY HESITANCY: Primary | ICD-10-CM

## 2018-09-20 DIAGNOSIS — F51.01 PRIMARY INSOMNIA: ICD-10-CM

## 2018-09-20 DIAGNOSIS — M25.562 ACUTE PAIN OF LEFT KNEE: ICD-10-CM

## 2018-09-20 LAB
BILIRUB BLD-MCNC: NEGATIVE MG/DL
CLARITY, POC: CLEAR
COLOR UR: YELLOW
GLUCOSE UR STRIP-MCNC: NEGATIVE MG/DL
KETONES UR QL: NEGATIVE
LEUKOCYTE EST, POC: NEGATIVE
NITRITE UR-MCNC: NEGATIVE MG/ML
PH UR: 5 [PH] (ref 5–8)
PROT UR STRIP-MCNC: ABNORMAL MG/DL
RBC # UR STRIP: ABNORMAL /UL
SP GR UR: 1.02 (ref 1–1.03)
UROBILINOGEN UR QL: NORMAL

## 2018-09-20 PROCEDURE — 99213 OFFICE O/P EST LOW 20 MIN: CPT | Performed by: NURSE PRACTITIONER

## 2018-09-20 PROCEDURE — 81002 URINALYSIS NONAUTO W/O SCOPE: CPT | Performed by: NURSE PRACTITIONER

## 2018-09-20 RX ORDER — SULFAMETHOXAZOLE AND TRIMETHOPRIM 800; 160 MG/1; MG/1
1 TABLET ORAL 2 TIMES DAILY
Qty: 14 TABLET | Refills: 0 | Status: SHIPPED | OUTPATIENT
Start: 2018-09-20 | End: 2019-01-30

## 2018-09-20 RX ORDER — ZOLPIDEM TARTRATE 10 MG/1
10 TABLET ORAL NIGHTLY PRN
Qty: 30 TABLET | Refills: 2 | Status: SHIPPED | OUTPATIENT
Start: 2018-09-20 | End: 2019-06-27 | Stop reason: SDUPTHER

## 2018-09-20 RX ORDER — METRONIDAZOLE 7.5 MG/G
GEL VAGINAL
Qty: 70 G | Refills: 1 | Status: SHIPPED | OUTPATIENT
Start: 2018-09-20 | End: 2018-10-19 | Stop reason: ALTCHOICE

## 2018-09-20 NOTE — PROGRESS NOTES
Chief Complaint   Patient presents with   • Follow-up     had breast reduction June , has a knot under right breast   • Knee Pain     left knee pain x 2 weeks . Has been exercising   • Insomnia     5 mg ambien are not working   • Urinary Tract Infection   • Med Refill     Subjective   Marissa Bunch is a 43 y.o. female.     Knee Pain    The incident occurred 2 days ago. The incident occurred at the gym. There was no injury mechanism. The pain is present in the right knee. The pain is at a severity of 4/10. The pain is mild. The pain has been constant since onset. Associated symptoms include numbness. Pertinent negatives include no inability to bear weight, loss of motion, loss of sensation, muscle weakness or tingling. The symptoms are aggravated by movement. The treatment provided mild relief.   Insomnia   This is a chronic problem. The current episode started more than 1 year ago. The problem occurs constantly. The problem has been gradually improving. Associated symptoms include arthralgias, headaches, myalgias, neck pain and numbness. Pertinent negatives include no abdominal pain, change in bowel habit, chest pain, chills, congestion, coughing, fatigue, fever, joint swelling, nausea, rash, sore throat, swollen glands, urinary symptoms, visual change, vomiting or weakness. Nothing aggravates the symptoms. Treatments tried: ambien helps  The treatment provided moderate relief.   Urinary Tract Infection    Pertinent negatives include no chills, flank pain, frequency, hematuria, nausea, urgency or vomiting.   Neck Pain    This is a recurrent problem. The current episode started more than 1 year ago. The problem occurs daily. The problem has been rapidly worsening. The pain is associated with nothing. The pain is present in the anterior neck, left side and midline. The quality of the pain is described as shooting. The pain is at a severity of 10/10. The pain is moderate. The symptoms are aggravated by position  and twisting. The pain is same all the time. Stiffness is present all day. Associated symptoms include headaches, numbness and paresis. Pertinent negatives include no chest pain, fever, leg pain, pain with swallowing, photophobia, syncope, tingling, trouble swallowing, visual change, weakness or weight loss. She has tried acetaminophen, bed rest, home exercises, heat, ice, muscle relaxants, neck support, NSAIDs and oral narcotics (pt, chiroptractor meds ) for the symptoms.   Gynecologic Exam   The patient's primary symptoms include vaginal discharge. The patient's pertinent negatives include no genital itching, genital lesions, genital odor, genital rash, missed menses, pelvic pain or vaginal bleeding. The problem occurs intermittently. Associated symptoms include back pain and headaches. Pertinent negatives include no abdominal pain, chills, constipation, diarrhea, dysuria, fever, flank pain, frequency, hematuria, nausea, rash, sore throat, urgency or vomiting. The vaginal bleeding is typical of menses. She has not been passing clots. She has not been passing tissue. Treatments tried: prescription cream  The treatment provided no relief. She is sexually active.        The following portions of the patient's history were reviewed and updated as appropriate: allergies, current medications, past social history and problem list.    Review of Systems   Constitutional: Negative.  Negative for chills, fatigue, fever and weight loss.   HENT: Negative for congestion, sore throat and trouble swallowing.    Eyes: Negative.  Negative for photophobia, pain, discharge, redness and itching.   Respiratory: Negative.  Negative for apnea, cough, choking, chest tightness, shortness of breath, wheezing and stridor.    Cardiovascular: Negative.  Negative for chest pain, palpitations, leg swelling and syncope.   Gastrointestinal: Negative.  Negative for abdominal pain, anal bleeding, blood in stool, change in bowel habit, constipation,  "diarrhea, nausea and vomiting.   Endocrine: Negative.  Negative for cold intolerance, heat intolerance, polydipsia, polyphagia and polyuria.   Genitourinary: Positive for menstrual problem, vaginal discharge and vaginal pain. Negative for decreased urine volume, difficulty urinating, dyspareunia, dysuria, enuresis, flank pain, frequency, genital sores, hematuria, missed menses, pelvic pain, urgency and vaginal bleeding.   Musculoskeletal: Positive for arthralgias, back pain, gait problem, myalgias, neck pain and neck stiffness. Negative for joint swelling.   Skin: Negative.  Negative for color change, pallor, rash and wound.   Allergic/Immunologic: Negative.    Neurological: Positive for numbness and headaches. Negative for tingling and weakness.   Hematological: Negative.  Negative for adenopathy. Does not bruise/bleed easily.   Psychiatric/Behavioral: Positive for sleep disturbance. Negative for agitation and behavioral problems. The patient is nervous/anxious and has insomnia.    All other systems reviewed and are negative.      Objective   /80   Ht 170.2 cm (67\")   Wt 83.5 kg (184 lb)   BMI 28.82 kg/m²   Physical Exam   Constitutional: She is oriented to person, place, and time. She appears well-developed and well-nourished.   HENT:   Head: Normocephalic and atraumatic.   Mouth/Throat: Oropharyngeal exudate present.   Eyes: Pupils are equal, round, and reactive to light. Conjunctivae and EOM are normal. Right eye exhibits no discharge. Left eye exhibits no discharge. No scleral icterus.   Neck: Normal range of motion. Neck supple.   Cardiovascular: Normal rate, regular rhythm, normal heart sounds, intact distal pulses and normal pulses.  Exam reveals no gallop and no friction rub.    No murmur heard.  Pulmonary/Chest: Effort normal. No respiratory distress. She has no wheezes. She has no rales. Chest wall is not dull to percussion. She exhibits no mass, no tenderness, no bony tenderness, no laceration, " no crepitus, no edema, no deformity, no swelling and no retraction. Right breast exhibits no inverted nipple, no mass, no nipple discharge, no skin change and no tenderness. Left breast exhibits no inverted nipple, no mass, no nipple discharge, no skin change and no tenderness. Breasts are symmetrical. There is no breast swelling.   Healing surgical scars both breast-scar tissue present    Abdominal: Soft. Bowel sounds are normal. She exhibits no distension. There is no tenderness.   Genitourinary: Rectum normal and vagina normal. No breast tenderness, discharge or bleeding. Pelvic exam was performed with patient supine. Uterus is not deviated, not enlarged, not fixed and not tender. Cervix exhibits no motion tenderness, no discharge and no friability. Right adnexum displays no mass. Left adnexum displays no mass.   Musculoskeletal: Normal range of motion. She exhibits tenderness. She exhibits no edema or deformity.        Left knee: She exhibits swelling and bony tenderness.   Neurological: She is alert and oriented to person, place, and time. She has normal reflexes. She displays normal reflexes. No cranial nerve deficit or sensory deficit. She exhibits normal muscle tone. Coordination normal.   Skin: Skin is warm and dry.   Psychiatric: She has a normal mood and affect.   Nursing note and vitals reviewed.      Assessment/Plan   Problem List Items Addressed This Visit        Musculoskeletal and Integument    Acute pain of left knee       Genitourinary    Urinary hesitancy - Primary    Relevant Orders    POCT urinalysis dipstick, manual (Completed)       Other    Insomnia           New Medications Ordered This Visit   Medications   • zolpidem (AMBIEN) 10 MG tablet     Sig: Take 1 tablet by mouth At Night As Needed for Sleep.     Dispense:  30 tablet     Refill:  2   • sulfamethoxazole-trimethoprim (BACTRIM DS) 800-160 MG per tablet     Sig: Take 1 tablet by mouth 2 (Two) Times a Day.     Dispense:  14 tablet      Refill:  0   • metroNIDAZOLE (METROGEL VAGINAL) 0.75 % vaginal gel     Sig: Daily for 5 nights     Dispense:  70 g     Refill:  1       Patient understands the risks associated with this controlled medication, including tolerance and addiction.  she also agrees to only obtain this medication from me, and not from a another provider, unless that provider is covering for me in my absence.  she also agrees to be compliant in dosing, and not self adjust the dose of medication.  A signed controlled substance agreement is on file, and she has received a controlled substance education sheet at this a previous visit.  she has also signed a consent for treatment with a controlled substance as per [facility] policy. OLYA was obtained.

## 2018-10-18 ENCOUNTER — TELEPHONE (OUTPATIENT)
Dept: FAMILY MEDICINE CLINIC | Facility: CLINIC | Age: 43
End: 2018-10-18

## 2018-10-18 NOTE — TELEPHONE ENCOUNTER
Patient called and said the new medication you gave her to replace the medicine you give her for BV is not working at all and wants to go back on what you have been giving her in the past.

## 2018-10-19 NOTE — TELEPHONE ENCOUNTER
She called me back and said it was the metronidazole gel and she did not like it at all. Wanting to go back to the supp

## 2018-12-14 RX ORDER — ALBUTEROL SULFATE 90 MCG
HFA AEROSOL WITH ADAPTER (GRAM) INHALATION
Qty: 6.7 G | Refills: 0 | Status: SHIPPED | OUTPATIENT
Start: 2018-12-14 | End: 2019-01-12 | Stop reason: SDUPTHER

## 2018-12-14 RX ORDER — VERAPAMIL HYDROCHLORIDE 240 MG/1
TABLET, FILM COATED, EXTENDED RELEASE ORAL
Qty: 60 TABLET | Refills: 1 | Status: SHIPPED | OUTPATIENT
Start: 2018-12-14 | End: 2019-04-13 | Stop reason: SDUPTHER

## 2019-01-14 RX ORDER — ALBUTEROL SULFATE 90 MCG
HFA AEROSOL WITH ADAPTER (GRAM) INHALATION
Qty: 6.7 G | Refills: 11 | Status: SHIPPED | OUTPATIENT
Start: 2019-01-14 | End: 2020-01-21

## 2019-01-30 ENCOUNTER — OFFICE VISIT (OUTPATIENT)
Dept: FAMILY MEDICINE CLINIC | Facility: CLINIC | Age: 44
End: 2019-01-30

## 2019-01-30 VITALS
OXYGEN SATURATION: 98 % | TEMPERATURE: 98.4 F | WEIGHT: 190 LBS | SYSTOLIC BLOOD PRESSURE: 120 MMHG | HEIGHT: 67 IN | BODY MASS INDEX: 29.82 KG/M2 | DIASTOLIC BLOOD PRESSURE: 82 MMHG

## 2019-01-30 DIAGNOSIS — J01.80 ACUTE NON-RECURRENT SINUSITIS OF OTHER SINUS: ICD-10-CM

## 2019-01-30 DIAGNOSIS — R09.81 NASAL CONGESTION: Primary | ICD-10-CM

## 2019-01-30 PROBLEM — J01.90 ACUTE INFECTION OF NASAL SINUS: Status: ACTIVE | Noted: 2019-01-30

## 2019-01-30 PROCEDURE — 99213 OFFICE O/P EST LOW 20 MIN: CPT | Performed by: NURSE PRACTITIONER

## 2019-01-30 PROCEDURE — 96372 THER/PROPH/DIAG INJ SC/IM: CPT | Performed by: NURSE PRACTITIONER

## 2019-01-30 RX ORDER — CLARITHROMYCIN 500 MG/1
500 TABLET, COATED ORAL 2 TIMES DAILY
Qty: 14 TABLET | Refills: 0 | Status: SHIPPED | OUTPATIENT
Start: 2019-01-30 | End: 2019-03-27

## 2019-01-30 RX ORDER — FLUTICASONE PROPIONATE 50 MCG
2 SPRAY, SUSPENSION (ML) NASAL DAILY
Qty: 16 G | Refills: 11 | Status: SHIPPED | OUTPATIENT
Start: 2019-01-30 | End: 2019-03-27

## 2019-01-30 RX ORDER — TRIAMCINOLONE ACETONIDE 40 MG/ML
80 INJECTION, SUSPENSION INTRA-ARTICULAR; INTRAMUSCULAR ONCE
Status: COMPLETED | OUTPATIENT
Start: 2019-01-30 | End: 2019-01-30

## 2019-01-30 RX ORDER — FLUCONAZOLE 150 MG/1
150 TABLET ORAL ONCE
Qty: 1 TABLET | Refills: 0 | Status: SHIPPED | OUTPATIENT
Start: 2019-01-30 | End: 2019-01-30

## 2019-01-30 RX ADMIN — TRIAMCINOLONE ACETONIDE 80 MG: 40 INJECTION, SUSPENSION INTRA-ARTICULAR; INTRAMUSCULAR at 09:51

## 2019-01-30 NOTE — PROGRESS NOTES
Chief Complaint   Patient presents with   • Sinusitis   • Shortness of Breath     cant breath good     Subjective   Marissa Bunch is a 43 y.o. female.     Sinusitis   This is a new problem. The current episode started in the past 7 days. The problem has been gradually worsening since onset. There has been no fever. Her pain is at a severity of 3/10. The pain is mild. Associated symptoms include congestion, coughing, shortness of breath, sinus pressure, sneezing, a sore throat and swollen glands. Pertinent negatives include no chills, diaphoresis, ear pain, headaches, hoarse voice or neck pain. Past treatments include acetaminophen. The treatment provided no relief.   Shortness of Breath   This is a new problem. The current episode started in the past 7 days. The problem occurs intermittently. The problem has been gradually worsening. Associated symptoms include a sore throat and swollen glands. Pertinent negatives include no abdominal pain, chest pain, claudication, coryza, ear pain, headaches, hemoptysis, leg pain, leg swelling, neck pain, rhinorrhea, sputum production, syncope, vomiting or wheezing.        The following portions of the patient's history were reviewed and updated as appropriate: allergies, current medications, past social history and problem list.    Review of Systems   Constitutional: Positive for activity change, appetite change and fatigue. Negative for chills and diaphoresis.   HENT: Positive for congestion, sinus pressure, sinus pain, sneezing and sore throat. Negative for dental problem, ear pain, hoarse voice and rhinorrhea.    Eyes: Negative for photophobia, pain and visual disturbance.   Respiratory: Positive for cough and shortness of breath. Negative for apnea, hemoptysis, sputum production, choking, chest tightness, wheezing and stridor.    Cardiovascular: Negative.  Negative for chest pain, claudication, leg swelling and syncope.   Gastrointestinal: Negative.  Negative for  "abdominal pain and vomiting.   Endocrine: Negative.    Genitourinary: Negative.    Musculoskeletal: Negative.  Negative for neck pain.   Allergic/Immunologic: Negative.    Neurological: Negative for headaches.   Hematological: Negative.    Psychiatric/Behavioral: Negative.        Objective   /82   Temp 98.4 °F (36.9 °C) (Tympanic)   Ht 170.2 cm (67\")   Wt 86.2 kg (190 lb)   SpO2 98%   BMI 29.76 kg/m²   Physical Exam   Constitutional: She is oriented to person, place, and time. She appears well-developed and well-nourished.   HENT:   Head: Normocephalic and atraumatic.   Mouth/Throat: Oropharyngeal exudate present.   Eyes: Conjunctivae and EOM are normal. Pupils are equal, round, and reactive to light. Right eye exhibits no discharge. Left eye exhibits no discharge. No scleral icterus.   Neck: Normal range of motion. Neck supple.   Cardiovascular: Normal rate, regular rhythm, normal heart sounds, intact distal pulses and normal pulses.   Pulmonary/Chest: Effort normal. No stridor. No respiratory distress. She has no wheezes. She has no rales. Chest wall is not dull to percussion. She exhibits no mass, no tenderness, no bony tenderness, no laceration, no crepitus, no edema, no deformity, no swelling and no retraction. Right breast exhibits no inverted nipple, no mass, no nipple discharge, no skin change and no tenderness. Left breast exhibits no inverted nipple, no mass, no nipple discharge, no skin change and no tenderness. Breasts are symmetrical. There is no breast swelling.   Abdominal: Soft. Bowel sounds are normal. She exhibits no distension. There is no tenderness.   Genitourinary: Rectum normal and vagina normal. No breast tenderness, discharge or bleeding. Pelvic exam was performed with patient supine. Uterus is not deviated, not enlarged, not fixed and not tender. Cervix exhibits no motion tenderness, no discharge and no friability. Right adnexum displays no mass. Left adnexum displays no mass. "   Musculoskeletal: Normal range of motion.   Neurological: She is alert and oriented to person, place, and time. She has normal reflexes. She displays normal reflexes. No cranial nerve deficit or sensory deficit. She exhibits normal muscle tone. Coordination normal.   Skin: Skin is warm and dry.   Psychiatric: She has a normal mood and affect.   Nursing note and vitals reviewed.      Assessment/Plan   Problem List Items Addressed This Visit        Respiratory    Nasal congestion - Primary    Acute infection of nasal sinus    Relevant Medications    triamcinolone acetonide (KENALOG-40) injection 80 mg (Completed)           New Medications Ordered This Visit   Medications   • fluticasone (FLONASE) 50 MCG/ACT nasal spray     Si sprays into the nostril(s) as directed by provider Daily.     Dispense:  16 g     Refill:  11   • mometasone-formoterol (DULERA 100) 100-5 MCG/ACT inhaler     Sig: Inhale 2 puffs 2 (Two) Times a Day.     Dispense:  13 g     Refill:  11   • clarithromycin (BIAXIN) 500 MG tablet     Sig: Take 1 tablet by mouth 2 (Two) Times a Day.     Dispense:  14 tablet     Refill:  0   • diclofenac (VOLTAREN) 1 % gel gel     Sig: Apply 4 g topically to the appropriate area as directed 3 (Three) Times a Day.     Dispense:  100 g     Refill:  11   • fluconazole (DIFLUCAN) 150 MG tablet     Sig: Take 1 tablet by mouth 1 (One) Time for 1 dose.     Dispense:  1 tablet     Refill:  0   • triamcinolone acetonide (KENALOG-40) injection 80 mg      meds as directed, diet discussed

## 2019-03-04 RX ORDER — ELETRIPTAN HYDROBROMIDE 40 MG/1
40 TABLET, FILM COATED ORAL DAILY PRN
Qty: 12 TABLET | Refills: 4 | Status: SHIPPED | OUTPATIENT
Start: 2019-03-04 | End: 2019-04-11 | Stop reason: SDUPTHER

## 2019-03-21 RX ORDER — ZOLPIDEM TARTRATE 10 MG/1
TABLET ORAL
Qty: 30 TABLET | Refills: 0 | OUTPATIENT
Start: 2019-03-21

## 2019-03-21 RX ORDER — BUTALBITAL, ACETAMINOPHEN AND CAFFEINE 50; 325; 40 MG/1; MG/1; MG/1
TABLET ORAL
Qty: 60 TABLET | Refills: 0 | OUTPATIENT
Start: 2019-03-21

## 2019-04-05 RX ORDER — ELETRIPTAN HYDROBROMIDE 40 MG/1
TABLET, FILM COATED ORAL
Qty: 12 TABLET | Refills: 0 | Status: SHIPPED | OUTPATIENT
Start: 2019-04-05 | End: 2019-04-11 | Stop reason: SDUPTHER

## 2019-04-11 ENCOUNTER — TELEPHONE (OUTPATIENT)
Dept: FAMILY MEDICINE CLINIC | Facility: CLINIC | Age: 44
End: 2019-04-11

## 2019-04-11 RX ORDER — ELETRIPTAN HYDROBROMIDE 40 MG/1
40 TABLET, FILM COATED ORAL DAILY PRN
Qty: 12 TABLET | Refills: 4 | Status: SHIPPED | OUTPATIENT
Start: 2019-04-11 | End: 2019-04-25 | Stop reason: SDUPTHER

## 2019-04-15 RX ORDER — VERAPAMIL HYDROCHLORIDE 240 MG/1
TABLET, FILM COATED, EXTENDED RELEASE ORAL
Qty: 60 TABLET | Refills: 5 | Status: SHIPPED | OUTPATIENT
Start: 2019-04-15 | End: 2020-03-05 | Stop reason: SDUPTHER

## 2019-04-25 RX ORDER — ELETRIPTAN HYDROBROMIDE 40 MG/1
40 TABLET, FILM COATED ORAL DAILY PRN
Qty: 18 TABLET | Refills: 5 | Status: SHIPPED | OUTPATIENT
Start: 2019-04-25 | End: 2019-10-29 | Stop reason: SDUPTHER

## 2019-06-27 ENCOUNTER — OFFICE VISIT (OUTPATIENT)
Dept: FAMILY MEDICINE CLINIC | Facility: CLINIC | Age: 44
End: 2019-06-27

## 2019-06-27 ENCOUNTER — APPOINTMENT (OUTPATIENT)
Dept: LAB | Facility: HOSPITAL | Age: 44
End: 2019-06-27

## 2019-06-27 VITALS
DIASTOLIC BLOOD PRESSURE: 80 MMHG | WEIGHT: 190 LBS | HEIGHT: 67 IN | SYSTOLIC BLOOD PRESSURE: 130 MMHG | BODY MASS INDEX: 29.82 KG/M2

## 2019-06-27 DIAGNOSIS — R53.83 MALAISE AND FATIGUE: ICD-10-CM

## 2019-06-27 DIAGNOSIS — R53.81 MALAISE AND FATIGUE: ICD-10-CM

## 2019-06-27 DIAGNOSIS — F51.01 PRIMARY INSOMNIA: ICD-10-CM

## 2019-06-27 DIAGNOSIS — Z12.31 ENCOUNTER FOR SCREENING MAMMOGRAM FOR MALIGNANT NEOPLASM OF BREAST: ICD-10-CM

## 2019-06-27 DIAGNOSIS — Z00.00 ADULT GENERAL MEDICAL EXAM: Primary | ICD-10-CM

## 2019-06-27 LAB
25(OH)D3 SERPL-MCNC: 32.4 NG/ML (ref 30–100)
ALBUMIN SERPL-MCNC: 4 G/DL (ref 3.5–5.2)
ALBUMIN/GLOB SERPL: 1.3 G/DL
ALP SERPL-CCNC: 82 U/L (ref 39–117)
ALT SERPL W P-5'-P-CCNC: 18 U/L (ref 1–33)
ANION GAP SERPL CALCULATED.3IONS-SCNC: 12.4 MMOL/L (ref 5–15)
AST SERPL-CCNC: 16 U/L (ref 1–32)
BASOPHILS # BLD AUTO: 0.05 10*3/MM3 (ref 0–0.2)
BASOPHILS NFR BLD AUTO: 0.5 % (ref 0–1.5)
BILIRUB SERPL-MCNC: 0.2 MG/DL (ref 0.2–1.2)
BUN BLD-MCNC: 10 MG/DL (ref 6–20)
BUN/CREAT SERPL: 11.6 (ref 7–25)
CALCIUM SPEC-SCNC: 9.9 MG/DL (ref 8.6–10.5)
CHLORIDE SERPL-SCNC: 100 MMOL/L (ref 98–107)
CHOLEST SERPL-MCNC: 193 MG/DL (ref 0–200)
CO2 SERPL-SCNC: 27.6 MMOL/L (ref 22–29)
CREAT BLD-MCNC: 0.86 MG/DL (ref 0.57–1)
DEPRECATED RDW RBC AUTO: 45 FL (ref 37–54)
EOSINOPHIL # BLD AUTO: 0.21 10*3/MM3 (ref 0–0.4)
EOSINOPHIL NFR BLD AUTO: 2 % (ref 0.3–6.2)
ERYTHROCYTE [DISTWIDTH] IN BLOOD BY AUTOMATED COUNT: 12.9 % (ref 12.3–15.4)
GFR SERPL CREATININE-BSD FRML MDRD: 86 ML/MIN/1.73
GLOBULIN UR ELPH-MCNC: 3 GM/DL
GLUCOSE BLD-MCNC: 76 MG/DL (ref 65–99)
HCT VFR BLD AUTO: 44 % (ref 34–46.6)
HDLC SERPL-MCNC: 69 MG/DL (ref 40–60)
HGB BLD-MCNC: 14.7 G/DL (ref 12–15.9)
IMM GRANULOCYTES # BLD AUTO: 0.04 10*3/MM3 (ref 0–0.05)
IMM GRANULOCYTES NFR BLD AUTO: 0.4 % (ref 0–0.5)
IRON 24H UR-MRATE: 52 MCG/DL (ref 37–145)
LDLC SERPL CALC-MCNC: 99 MG/DL (ref 0–100)
LDLC/HDLC SERPL: 1.43 {RATIO}
LYMPHOCYTES # BLD AUTO: 1.44 10*3/MM3 (ref 0.7–3.1)
LYMPHOCYTES NFR BLD AUTO: 13.9 % (ref 19.6–45.3)
MAGNESIUM SERPL-MCNC: 2.2 MG/DL (ref 1.6–2.6)
MCH RBC QN AUTO: 31.8 PG (ref 26.6–33)
MCHC RBC AUTO-ENTMCNC: 33.4 G/DL (ref 31.5–35.7)
MCV RBC AUTO: 95.2 FL (ref 79–97)
MONOCYTES # BLD AUTO: 0.69 10*3/MM3 (ref 0.1–0.9)
MONOCYTES NFR BLD AUTO: 6.7 % (ref 5–12)
NEUTROPHILS # BLD AUTO: 7.92 10*3/MM3 (ref 1.7–7)
NEUTROPHILS NFR BLD AUTO: 76.5 % (ref 42.7–76)
NRBC BLD AUTO-RTO: 0 /100 WBC (ref 0–0.2)
PLATELET # BLD AUTO: 323 10*3/MM3 (ref 140–450)
PMV BLD AUTO: 11.5 FL (ref 6–12)
POTASSIUM BLD-SCNC: 4 MMOL/L (ref 3.5–5.2)
PROT SERPL-MCNC: 7 G/DL (ref 6–8.5)
RBC # BLD AUTO: 4.62 10*6/MM3 (ref 3.77–5.28)
SODIUM BLD-SCNC: 140 MMOL/L (ref 136–145)
TRIGL SERPL-MCNC: 127 MG/DL (ref 0–150)
TSH SERPL DL<=0.05 MIU/L-ACNC: 1.28 MIU/ML (ref 0.27–4.2)
VIT B12 BLD-MCNC: 574 PG/ML (ref 211–946)
VLDLC SERPL-MCNC: 25.4 MG/DL (ref 5–40)
WBC NRBC COR # BLD: 10.35 10*3/MM3 (ref 3.4–10.8)

## 2019-06-27 PROCEDURE — 80053 COMPREHEN METABOLIC PANEL: CPT | Performed by: NURSE PRACTITIONER

## 2019-06-27 PROCEDURE — 85025 COMPLETE CBC W/AUTO DIFF WBC: CPT | Performed by: NURSE PRACTITIONER

## 2019-06-27 PROCEDURE — 83540 ASSAY OF IRON: CPT | Performed by: NURSE PRACTITIONER

## 2019-06-27 PROCEDURE — 83735 ASSAY OF MAGNESIUM: CPT | Performed by: NURSE PRACTITIONER

## 2019-06-27 PROCEDURE — 82607 VITAMIN B-12: CPT | Performed by: NURSE PRACTITIONER

## 2019-06-27 PROCEDURE — 80061 LIPID PANEL: CPT | Performed by: NURSE PRACTITIONER

## 2019-06-27 PROCEDURE — 84443 ASSAY THYROID STIM HORMONE: CPT | Performed by: NURSE PRACTITIONER

## 2019-06-27 PROCEDURE — 82306 VITAMIN D 25 HYDROXY: CPT | Performed by: NURSE PRACTITIONER

## 2019-06-27 PROCEDURE — 36415 COLL VENOUS BLD VENIPUNCTURE: CPT | Performed by: NURSE PRACTITIONER

## 2019-06-27 PROCEDURE — 99396 PREV VISIT EST AGE 40-64: CPT | Performed by: NURSE PRACTITIONER

## 2019-06-27 RX ORDER — ZOLPIDEM TARTRATE 10 MG/1
10 TABLET ORAL NIGHTLY PRN
Qty: 30 TABLET | Refills: 2 | Status: SHIPPED | OUTPATIENT
Start: 2019-06-27 | End: 2019-09-11 | Stop reason: SDUPTHER

## 2019-06-27 RX ORDER — TRIAMCINOLONE ACETONIDE 1 MG/G
CREAM TOPICAL 2 TIMES DAILY
Qty: 45 G | Refills: 1 | Status: SHIPPED | OUTPATIENT
Start: 2019-06-27 | End: 2019-09-11

## 2019-06-27 NOTE — PROGRESS NOTES
Chief Complaint   Patient presents with   • Annual Exam     mammogram , no pap not due until 2021     Subjective   Marissa Bunch is a 44 y.o. female.     Insomnia   This is a chronic problem. The current episode started more than 1 year ago. The problem occurs intermittently. The problem has been rapidly improving. Associated symptoms include arthralgias, fatigue, joint swelling and a rash. Pertinent negatives include no abdominal pain, anorexia, change in bowel habit, chest pain, chills, congestion, coughing, diaphoresis, fever, headaches, myalgias, nausea, neck pain, numbness, sore throat, swollen glands, urinary symptoms, vertigo, visual change, vomiting or weakness. Treatments tried: ambien  The treatment provided mild relief.   Obesity   This is a chronic problem. The current episode started more than 1 month ago. Associated symptoms include arthralgias, fatigue, joint swelling and a rash. Pertinent negatives include no abdominal pain, anorexia, change in bowel habit, chest pain, chills, congestion, coughing, diaphoresis, fever, headaches, myalgias, nausea, neck pain, numbness, sore throat, swollen glands, urinary symptoms, vertigo, visual change, vomiting or weakness. Treatments tried: LOW CALORIE, LOW CARBS  The treatment provided mild relief.   Rash   This is a new problem. The current episode started in the past 7 days. The problem has been rapidly improving since onset. Location: RIGHT ARM AND ABDOMEN  Associated symptoms include fatigue. Pertinent negatives include no anorexia, congestion, cough, diarrhea, eye pain, facial edema, fever, joint pain, nail changes, shortness of breath, sore throat or vomiting. Past treatments include anti-itch cream and topical steroids. The treatment provided no relief. There is no history of allergies, asthma, eczema or varicella.        The following portions of the patient's history were reviewed and updated as appropriate: allergies, current medications, past  social history and problem list.    Review of Systems   Constitutional: Positive for activity change, appetite change and fatigue. Negative for chills, diaphoresis, fever and unexpected weight change.        WEIGHT GAIN    HENT: Negative.  Negative for congestion, dental problem, drooling, ear discharge, facial swelling, hearing loss, mouth sores, nosebleeds, postnasal drip, sinus pain, sore throat, tinnitus, trouble swallowing and voice change.    Eyes: Negative.  Negative for photophobia, pain, discharge, redness, itching and visual disturbance.   Respiratory: Negative for apnea, cough, choking, chest tightness, shortness of breath and stridor.    Cardiovascular: Negative.  Negative for chest pain, palpitations and leg swelling.   Gastrointestinal: Negative.  Negative for abdominal distention, abdominal pain, anal bleeding, anorexia, blood in stool, change in bowel habit, diarrhea, nausea, rectal pain and vomiting.   Endocrine: Negative.  Negative for cold intolerance, heat intolerance, polydipsia, polyphagia and polyuria.   Genitourinary: Negative.  Negative for decreased urine volume, difficulty urinating, dyspareunia, dysuria, enuresis, genital sores, menstrual problem, vaginal bleeding and vaginal pain.   Musculoskeletal: Positive for arthralgias, back pain, gait problem and joint swelling. Negative for joint pain, myalgias, neck pain and neck stiffness.   Skin: Positive for rash. Negative for color change, nail changes, pallor and wound.        RIGHT ARM AND ABDOMEN    Allergic/Immunologic: Negative.  Negative for environmental allergies, food allergies and immunocompromised state.   Neurological: Negative for dizziness, vertigo, tremors, seizures, syncope, facial asymmetry, speech difficulty, weakness, light-headedness, numbness and headaches.   Hematological: Negative.  Negative for adenopathy. Does not bruise/bleed easily.   Psychiatric/Behavioral: Negative for agitation, behavioral problems, confusion,  "decreased concentration, dysphoric mood, hallucinations, self-injury, sleep disturbance and suicidal ideas. The patient has insomnia. The patient is not nervous/anxious and is not hyperactive.        Objective   /80   Ht 170.2 cm (67\")   Wt 86.2 kg (190 lb)   BMI 29.76 kg/m²   Physical Exam   Constitutional: She is oriented to person, place, and time. She appears well-developed and well-nourished. No distress.   HENT:   Head: Normocephalic and atraumatic.   Right Ear: External ear normal.   Left Ear: External ear normal.   Mouth/Throat: Oropharynx is clear and moist. No oropharyngeal exudate.   Eyes: Conjunctivae and EOM are normal. Pupils are equal, round, and reactive to light. Right eye exhibits no discharge. Left eye exhibits no discharge. No scleral icterus.   Neck: Normal range of motion. Neck supple. No JVD present. No tracheal deviation present. No thyromegaly present.   Cardiovascular: Normal rate, regular rhythm, normal heart sounds, intact distal pulses and normal pulses. Exam reveals no gallop and no friction rub.   No murmur heard.  Pulmonary/Chest: Effort normal and breath sounds normal. No stridor. No respiratory distress. She has no wheezes. She has no rales. Chest wall is not dull to percussion. She exhibits no mass, no tenderness, no bony tenderness, no laceration, no crepitus, no edema, no deformity, no swelling and no retraction. Right breast exhibits no inverted nipple, no mass, no nipple discharge, no skin change and no tenderness. Left breast exhibits no inverted nipple, no mass, no nipple discharge, no skin change and no tenderness. Breasts are symmetrical. There is no breast swelling.   Abdominal: Soft. Bowel sounds are normal. She exhibits no distension and no mass. There is no tenderness. There is no rebound and no guarding. No hernia.   Genitourinary: Rectum normal. No breast tenderness, discharge or bleeding. Pelvic exam was performed with patient supine. Uterus is not deviated, " not enlarged, not fixed and not tender. Cervix exhibits no motion tenderness, no discharge and no friability. Right adnexum displays no mass. Left adnexum displays no mass.   Musculoskeletal: Normal range of motion. She exhibits tenderness. She exhibits no edema or deformity.   Lymphadenopathy:     She has no cervical adenopathy.   Neurological: She is alert and oriented to person, place, and time. She has normal reflexes. She displays normal reflexes. No cranial nerve deficit or sensory deficit. She exhibits normal muscle tone. Coordination normal.   Skin: Skin is warm and dry. Rash noted. She is not diaphoretic. No erythema. No pallor.   RASH TO ABDOMEN AND RIGHT ARM    Psychiatric: She has a normal mood and affect. Her behavior is normal. Judgment and thought content normal.   Nursing note and vitals reviewed.      Assessment/Plan   Problem List Items Addressed This Visit        Other    Adult general medical exam - Primary    Encounter for screening mammogram for malignant neoplasm of breast    Relevant Orders    Mammo Screening Digital Tomosynthesis Bilateral With CAD (Completed)    Insomnia    Relevant Orders    CBC & Differential (Completed)    Comprehensive Metabolic Panel (Completed)    Iron (Completed)    Lipid Panel (Completed)    Vitamin D 25 Hydroxy (Completed)    Vitamin B12 (Completed)    TSH (Completed)    Magnesium (Completed)    CBC Auto Differential (Completed)    Malaise and fatigue    Relevant Orders    CBC & Differential (Completed)    Comprehensive Metabolic Panel (Completed)    Iron (Completed)    Lipid Panel (Completed)    Vitamin D 25 Hydroxy (Completed)    Vitamin B12 (Completed)    TSH (Completed)    Magnesium (Completed)    CBC Auto Differential (Completed)    BMI 29.0-29.9,adult    Relevant Orders    CBC & Differential (Completed)    Comprehensive Metabolic Panel (Completed)    Iron (Completed)    Lipid Panel (Completed)    Vitamin D 25 Hydroxy (Completed)    Vitamin B12 (Completed)     TSH (Completed)    Magnesium (Completed)    CBC Auto Differential (Completed)           New Medications Ordered This Visit   Medications   • zolpidem (AMBIEN) 10 MG tablet     Sig: Take 1 tablet by mouth At Night As Needed for Sleep.     Dispense:  30 tablet     Refill:  2   • triamcinolone (KENALOG) 0.1 % cream     Sig: Apply  topically to the appropriate area as directed 2 (Two) Times a Day.     Dispense:  45 g     Refill:  1        It's not just what you eat, but when you eat  Eat breakfast, and eat smaller meals throughout the day. A healthy breakfast can jumpstart your metabolism, while eating small, healthy meals (rather than the standard three large meals) keeps your energy up.   Avoid eating at night. Try to eat dinner earlier and fast for 14-16 hours until breakfast the next morning. Studies suggest that eating only when you’re most active and giving your digestive system a long break each day may help to regulate weight.     Patient understands the risks associated with this controlled medication, including tolerance and addiction.  she also agrees to only obtain this medication from me, and not from a another provider, unless that provider is covering for me in my absence.  she also agrees to be compliant in dosing, and not self adjust the dose of medication.  A signed controlled substance agreement is on file, and she has received a controlled substance education sheet at this a previous visit.  she has also signed a consent for treatment with a controlled substance as per [facility] policy. OLYA was obtained.

## 2019-06-28 ENCOUNTER — TELEPHONE (OUTPATIENT)
Dept: FAMILY MEDICINE CLINIC | Facility: CLINIC | Age: 44
End: 2019-06-28

## 2019-06-28 NOTE — TELEPHONE ENCOUNTER
Per JOSE Mcdowell, Ms Bunch has been called with recent lab results & recommendations.  Continue current medications and follow-up as planned or sooner if any problems.-      ---- Message from JOSE Richey sent at 6/28/2019  8:03 AM CDT -----  Can you let her know normal

## 2019-06-28 NOTE — PROGRESS NOTES
Per JOSE Mcdowell, Ms Bunch has been called with recent lab results & recommendations.  Continue current medications and follow-up as planned or sooner if any problems.

## 2019-08-07 NOTE — TELEPHONE ENCOUNTER
Shruthi     Ms. Marissa Bunch is requesting a refill on her Belviq XR 20 mg  #30  Take 1 daily in the morning.      Last OV 06/27/19    Next Emma OV No Future Appt, Was asked to RTC in 1 month     Last Script Written 06/27/19  #30 with No Refill    Last OLYA  03/21/18    Please Advise on refill    Thank you

## 2019-08-08 RX ORDER — LORCASERIN HYDROCHLORIDE HEMIHYDRATE 20 MG/1
TABLET, FILM COATED, EXTENDED RELEASE ORAL
Qty: 30 TABLET | Refills: 0 | OUTPATIENT
Start: 2019-08-08 | End: 2019-09-11 | Stop reason: RX

## 2019-08-08 NOTE — TELEPHONE ENCOUNTER
Per JOSE Mcdowell, Ms. Bunch has been called and advised that Shruthi said she would refill her Belviq XR 20 mg this time but she would need to be seen before additional refills were needed.    Refill has been called to [facility] for Belviq XR 20 mg #30 Take 1 Daily in the AM  With No refills    Script changed to Phone In and sent back to Shruthi to sign off on.

## 2019-09-05 ENCOUNTER — TELEPHONE (OUTPATIENT)
Dept: FAMILY MEDICINE CLINIC | Facility: CLINIC | Age: 44
End: 2019-09-05

## 2019-09-05 NOTE — TELEPHONE ENCOUNTER
Pt received a call from the billing department regarding her bill for labs that she had when she was here for her Physical. They told her to call you that it wasn't coded right that it should have been coded as annual physical.

## 2019-09-11 ENCOUNTER — OFFICE VISIT (OUTPATIENT)
Dept: FAMILY MEDICINE CLINIC | Facility: CLINIC | Age: 44
End: 2019-09-11

## 2019-09-11 VITALS
BODY MASS INDEX: 29.66 KG/M2 | WEIGHT: 189 LBS | DIASTOLIC BLOOD PRESSURE: 88 MMHG | HEIGHT: 67 IN | SYSTOLIC BLOOD PRESSURE: 124 MMHG

## 2019-09-11 DIAGNOSIS — R23.2 HOT FLASHES: ICD-10-CM

## 2019-09-11 DIAGNOSIS — F51.01 PRIMARY INSOMNIA: ICD-10-CM

## 2019-09-11 DIAGNOSIS — I10 ESSENTIAL HYPERTENSION: Primary | ICD-10-CM

## 2019-09-11 DIAGNOSIS — N89.8 VAGINAL DISCHARGE: ICD-10-CM

## 2019-09-11 PROCEDURE — 99214 OFFICE O/P EST MOD 30 MIN: CPT | Performed by: NURSE PRACTITIONER

## 2019-09-11 RX ORDER — METRONIDAZOLE 7.5 MG/G
GEL VAGINAL DAILY
Qty: 70 G | Refills: 11 | Status: SHIPPED | OUTPATIENT
Start: 2019-09-11 | End: 2020-01-22 | Stop reason: ALTCHOICE

## 2019-09-11 RX ORDER — METOPROLOL SUCCINATE 25 MG/1
25 TABLET, EXTENDED RELEASE ORAL DAILY
Qty: 30 TABLET | Refills: 11 | Status: SHIPPED | OUTPATIENT
Start: 2019-09-11 | End: 2022-08-08

## 2019-09-11 RX ORDER — ZOLPIDEM TARTRATE 10 MG/1
10 TABLET ORAL NIGHTLY PRN
Qty: 30 TABLET | Refills: 2 | Status: SHIPPED | OUTPATIENT
Start: 2019-09-11 | End: 2020-10-11 | Stop reason: SDUPTHER

## 2019-09-11 RX ORDER — PAROXETINE 10 MG/1
10 TABLET, FILM COATED ORAL EVERY MORNING
Qty: 30 TABLET | Refills: 11 | Status: SHIPPED | OUTPATIENT
Start: 2019-09-11 | End: 2021-04-07 | Stop reason: SDUPTHER

## 2019-09-11 NOTE — PROGRESS NOTES
Chief Complaint   Patient presents with   • Hot Flashes     mood changes   • Hair falling out   • Hypertension     feels like pressure in her head   • Med Refill     ambein     Subjective   Marissa Bunch is a 44 y.o. female.     Insomnia   This is a chronic problem. The current episode started more than 1 year ago. The problem occurs constantly. The problem has been gradually improving. Associated symptoms include arthralgias, fatigue, myalgias and numbness. Pertinent negatives include no abdominal pain, anorexia, change in bowel habit, chest pain, chills, congestion, coughing, fever, headaches, joint swelling, nausea, neck pain, sore throat, urinary symptoms, vertigo, vomiting or weakness. The treatment provided mild relief.   Hypertension   This is a recurrent problem. The current episode started more than 1 year ago. The problem has been resolved since onset. Associated symptoms include malaise/fatigue. Pertinent negatives include no anxiety, blurred vision, chest pain, headaches, neck pain, orthopnea, palpitations, peripheral edema, PND, shortness of breath or sweats. There is no history of angina, kidney disease, CAD/MI, CVA, heart failure, left ventricular hypertrophy, PVD or retinopathy.   Fatigue   Associated symptoms include arthralgias, fatigue, myalgias and numbness. Pertinent negatives include no abdominal pain, anorexia, change in bowel habit, chest pain, chills, congestion, coughing, fever, headaches, joint swelling, nausea, neck pain, sore throat, urinary symptoms, vertigo, vomiting or weakness.   Vaginal Discharge   The patient's primary symptoms include vaginal discharge. The patient's pertinent negatives include no genital itching, genital lesions, genital odor, genital rash, missed menses or pelvic pain. This is a recurrent problem. The current episode started yesterday. The problem has been gradually worsening. Associated symptoms include back pain. Pertinent negatives include no  abdominal pain, anorexia, chills, constipation, diarrhea, dysuria, fever, headaches, hematuria, joint pain, nausea, sore throat, urgency or vomiting. The vaginal discharge was malodorous. There has been no bleeding.        The following portions of the patient's history were reviewed and updated as appropriate: allergies, current medications, past social history and problem list.    Review of Systems   Constitutional: Positive for activity change, appetite change, fatigue and malaise/fatigue. Negative for chills, fever and unexpected weight change.   HENT: Negative.  Negative for congestion, dental problem and sore throat.    Eyes: Negative.  Negative for blurred vision, photophobia, pain, redness and visual disturbance.   Respiratory: Negative.  Negative for apnea, cough, choking, chest tightness, shortness of breath, wheezing and stridor.    Cardiovascular: Negative.  Negative for chest pain, palpitations, orthopnea, leg swelling and PND.   Gastrointestinal: Negative.  Negative for abdominal distention, abdominal pain, anal bleeding, anorexia, blood in stool, change in bowel habit, constipation, diarrhea, nausea, rectal pain and vomiting.   Endocrine: Negative.  Negative for cold intolerance, heat intolerance, polydipsia, polyphagia and polyuria.   Genitourinary: Positive for menstrual problem and vaginal discharge. Negative for decreased urine volume, difficulty urinating, dyspareunia, dysuria, genital sores, hematuria, missed menses, pelvic pain, urgency, vaginal bleeding and vaginal pain.        Hot flashes    Musculoskeletal: Positive for arthralgias, back pain, myalgias and neck stiffness. Negative for gait problem, joint pain, joint swelling and neck pain.   Skin: Negative.  Negative for color change.   Allergic/Immunologic: Negative.  Negative for environmental allergies, food allergies and immunocompromised state.   Neurological: Positive for numbness. Negative for dizziness, vertigo, seizures, facial  "asymmetry, weakness, light-headedness and headaches.   Hematological: Negative.  Negative for adenopathy. Does not bruise/bleed easily.   Psychiatric/Behavioral: Negative for agitation, behavioral problems, confusion, decreased concentration, hallucinations, self-injury, sleep disturbance and suicidal ideas. The patient has insomnia. The patient is not nervous/anxious and is not hyperactive.        Objective   /88   Ht 170.2 cm (67\")   Wt 85.7 kg (189 lb)   BMI 29.60 kg/m²   Physical Exam   Constitutional: She is oriented to person, place, and time. She appears well-developed and well-nourished. No distress.   HENT:   Head: Normocephalic and atraumatic.   Right Ear: External ear normal.   Left Ear: External ear normal.   Mouth/Throat: Oropharynx is clear and moist. No oropharyngeal exudate.   Eyes: Conjunctivae and EOM are normal. Pupils are equal, round, and reactive to light. Right eye exhibits no discharge. Left eye exhibits no discharge. No scleral icterus.   Neck: Normal range of motion. Neck supple. No JVD present. No tracheal deviation present. No thyromegaly present.   Cardiovascular: Normal rate, regular rhythm, normal heart sounds, intact distal pulses and normal pulses. Exam reveals no gallop and no friction rub.   No murmur heard.  Pulmonary/Chest: Effort normal and breath sounds normal. No stridor. No respiratory distress. She has no wheezes. She has no rales. Chest wall is not dull to percussion. She exhibits no mass, no tenderness, no bony tenderness, no laceration, no crepitus, no edema, no deformity, no swelling and no retraction. Right breast exhibits no inverted nipple, no mass, no nipple discharge, no skin change and no tenderness. Left breast exhibits no inverted nipple, no mass, no nipple discharge, no skin change and no tenderness. Breasts are symmetrical. There is no breast swelling.   Abdominal: Soft. Bowel sounds are normal. She exhibits no distension and no mass. There is no " tenderness. There is no rebound and no guarding. No hernia.   Genitourinary: Rectum normal. No breast tenderness, discharge or bleeding. Pelvic exam was performed with patient supine. Uterus is not deviated, not enlarged, not fixed and not tender. Cervix exhibits no motion tenderness, no discharge and no friability. Right adnexum displays no mass. Left adnexum displays no mass.   Genitourinary Comments: Deferred per patient request hx of same    Musculoskeletal: Normal range of motion. She exhibits tenderness. She exhibits no edema or deformity.   Lymphadenopathy:     She has no cervical adenopathy.   Neurological: She is alert and oriented to person, place, and time. She has normal reflexes. She displays normal reflexes. No cranial nerve deficit or sensory deficit. She exhibits normal muscle tone. Coordination normal.   Skin: Skin is warm and dry. Rash noted. She is not diaphoretic. No erythema. No pallor.   RASH TO ABDOMEN AND RIGHT ARM    Psychiatric: She has a normal mood and affect. Her behavior is normal. Judgment and thought content normal.   Nursing note and vitals reviewed.      Assessment/Plan   Problem List Items Addressed This Visit        Cardiovascular and Mediastinum    Hot flashes    Essential hypertension - Primary    Relevant Medications    metoprolol succinate XL (TOPROL XL) 25 MG 24 hr tablet       Genitourinary    Vaginal discharge       Other    Insomnia           New Medications Ordered This Visit   Medications   • zolpidem (AMBIEN) 10 MG tablet     Sig: Take 1 tablet by mouth At Night As Needed for Sleep.     Dispense:  30 tablet     Refill:  2   • metoprolol succinate XL (TOPROL XL) 25 MG 24 hr tablet     Sig: Take 1 tablet by mouth Daily.     Dispense:  30 tablet     Refill:  11   • Erenumab-aooe (AIMOVIG) 70 MG/ML prefilled syringe     Sig: Inject 1 mL under the skin into the appropriate area as directed 1 (One) Time for 1 dose.     Dispense:  4 mL     Refill:  11   • PARoxetine (PAXIL) 10  MG tablet     Sig: Take 1 tablet by mouth Every Morning.     Dispense:  30 tablet     Refill:  11   • metroNIDAZOLE (METROGEL VAGINAL) 0.75 % vaginal gel     Sig: Insert  into the vagina Daily. X 5 nights     Dispense:  70 g     Refill:  11       Patient understands the risks associated with this controlled medication, including tolerance and addiction.  she also agrees to only obtain this medication from me, and not from a another provider, unless that provider is covering for me in my absence.  she also agrees to be compliant in dosing, and not self adjust the dose of medication.  A signed controlled substance agreement is on file, and she has received a controlled substance education sheet at this a previous visit.  she has also signed a consent for treatment with a controlled substance as per [facility] policy. OLYA was obtained.      Refill ambien, aimovig add toprol xl  and, aimovig for chronic migraines, paxil for hot flashes and mood swings-follow up if worsen

## 2019-10-29 RX ORDER — ELETRIPTAN HYDROBROMIDE 40 MG/1
40 TABLET, FILM COATED ORAL DAILY PRN
Qty: 18 TABLET | Refills: 5 | Status: SHIPPED | OUTPATIENT
Start: 2019-10-29 | End: 2020-07-21

## 2019-11-05 ENCOUNTER — TELEPHONE (OUTPATIENT)
Dept: FAMILY MEDICINE CLINIC | Facility: CLINIC | Age: 44
End: 2019-11-05

## 2019-11-05 NOTE — TELEPHONE ENCOUNTER
She called and said had wellness visit done on June 27- said lab code needs to be changed to preventive-said the visit was changed but not lab. She can be reached at [phone].

## 2019-12-11 RX ORDER — CLINDAMYCIN PHOSPHATE 100 MG/1
SUPPOSITORY VAGINAL
Qty: 3 SUPPOSITORY | Refills: 0 | OUTPATIENT
Start: 2019-12-11

## 2020-01-21 RX ORDER — ALBUTEROL SULFATE 90 UG/1
AEROSOL, METERED RESPIRATORY (INHALATION)
Qty: 6.7 G | Refills: 5 | Status: SHIPPED | OUTPATIENT
Start: 2020-01-21 | End: 2021-04-01

## 2020-01-22 ENCOUNTER — TELEPHONE (OUTPATIENT)
Dept: FAMILY MEDICINE CLINIC | Facility: CLINIC | Age: 45
End: 2020-01-22

## 2020-01-22 NOTE — TELEPHONE ENCOUNTER
PT STATES SHE WAS GIVEN A CREAM FOR A BACTERIAL INFECTION. SHE STATES SHE WOULD RATHER HAVE THE SUPPOSITORIES. SHE WOULD LIKE CALLED IN TO [facility]

## 2020-01-30 ENCOUNTER — TELEPHONE (OUTPATIENT)
Dept: FAMILY MEDICINE CLINIC | Facility: CLINIC | Age: 45
End: 2020-01-30

## 2020-03-05 ENCOUNTER — OFFICE VISIT (OUTPATIENT)
Dept: FAMILY MEDICINE CLINIC | Facility: CLINIC | Age: 45
End: 2020-03-05

## 2020-03-05 VITALS
SYSTOLIC BLOOD PRESSURE: 130 MMHG | BODY MASS INDEX: 32.18 KG/M2 | HEIGHT: 67 IN | WEIGHT: 205 LBS | DIASTOLIC BLOOD PRESSURE: 90 MMHG

## 2020-03-05 DIAGNOSIS — R60.0 LEG EDEMA: Primary | ICD-10-CM

## 2020-03-05 PROCEDURE — 99213 OFFICE O/P EST LOW 20 MIN: CPT | Performed by: NURSE PRACTITIONER

## 2020-03-05 RX ORDER — HYDROCHLOROTHIAZIDE 25 MG/1
TABLET ORAL
Qty: 30 TABLET | Refills: 11 | Status: SHIPPED | OUTPATIENT
Start: 2020-03-05 | End: 2020-03-05

## 2020-03-05 RX ORDER — HYDROCHLOROTHIAZIDE 25 MG/1
TABLET ORAL
Qty: 30 TABLET | Refills: 11 | Status: SHIPPED | OUTPATIENT
Start: 2020-03-05 | End: 2021-07-12

## 2020-03-05 RX ORDER — VERAPAMIL HYDROCHLORIDE 240 MG/1
240 TABLET, FILM COATED, EXTENDED RELEASE ORAL 2 TIMES DAILY
Qty: 60 TABLET | Refills: 11 | Status: SHIPPED | OUTPATIENT
Start: 2020-03-05 | End: 2021-04-07 | Stop reason: SDUPTHER

## 2020-03-05 NOTE — PROGRESS NOTES
Chief Complaint   Patient presents with   • Leg Swelling     both legs    • Foot Pain     when she gets up in the morning     Subjective    Marissa Bunch is a 44 y.o. female.     Leg Swelling   This is a chronic problem. The current episode started more than 1 year ago. The problem occurs daily. The problem has been gradually worsening. Associated symptoms include arthralgias, fatigue, myalgias and numbness. Pertinent negatives include no abdominal pain, anorexia, change in bowel habit, chest pain, chills, congestion, coughing, diaphoresis, fever, headaches, joint swelling, nausea, neck pain, sore throat, swollen glands, urinary symptoms, vertigo, visual change, vomiting or weakness. Exacerbated by: weight gain  She has tried nothing for the symptoms. The treatment provided mild relief.        The following portions of the patient's history were reviewed and updated as appropriate: allergies, current medications, past social history and problem list.    Review of Systems   Constitutional: Positive for activity change, appetite change, fatigue and unexpected weight change. Negative for chills, diaphoresis and fever.        Weight gain    HENT: Negative.  Negative for congestion, dental problem, mouth sores, nosebleeds and sore throat.    Eyes: Negative.  Negative for photophobia, pain, redness and visual disturbance.   Respiratory: Negative.  Negative for apnea, cough, choking, chest tightness, shortness of breath, wheezing and stridor.    Cardiovascular: Negative.  Negative for chest pain, palpitations and leg swelling.   Gastrointestinal: Negative.  Negative for abdominal distention, abdominal pain, anal bleeding, anorexia, blood in stool, change in bowel habit, constipation, diarrhea, nausea, rectal pain and vomiting.   Endocrine: Negative.  Negative for cold intolerance, heat intolerance, polydipsia, polyphagia and polyuria.   Genitourinary: Positive for menstrual problem. Negative for decreased urine  "volume, difficulty urinating, dyspareunia, dysuria, genital sores, hematuria, pelvic pain, urgency, vaginal bleeding, vaginal discharge and vaginal pain.        Hot flashes    Musculoskeletal: Positive for arthralgias, back pain, myalgias and neck stiffness. Negative for gait problem, joint swelling and neck pain.   Skin: Negative.  Negative for color change.   Allergic/Immunologic: Negative.  Negative for environmental allergies, food allergies and immunocompromised state.   Neurological: Positive for numbness. Negative for dizziness, vertigo, tremors, seizures, facial asymmetry, weakness, light-headedness and headaches.   Hematological: Negative.  Negative for adenopathy. Does not bruise/bleed easily.   Psychiatric/Behavioral: Negative for agitation, behavioral problems, confusion, decreased concentration, hallucinations, self-injury, sleep disturbance and suicidal ideas. The patient is not nervous/anxious and is not hyperactive.        Objective   /90   Ht 170.2 cm (67\")   Wt 93 kg (205 lb)   BMI 32.11 kg/m²   Physical Exam   Constitutional: She is oriented to person, place, and time. She appears well-developed and well-nourished. No distress.   HENT:   Head: Normocephalic and atraumatic.   Right Ear: External ear normal.   Left Ear: External ear normal.   Mouth/Throat: Oropharynx is clear and moist. No oropharyngeal exudate.   Eyes: Pupils are equal, round, and reactive to light. Conjunctivae and EOM are normal. Right eye exhibits no discharge. Left eye exhibits no discharge. No scleral icterus.   Neck: Normal range of motion. Neck supple. No JVD present. No tracheal deviation present. No thyromegaly present.   Cardiovascular: Normal rate, regular rhythm, normal heart sounds, intact distal pulses and normal pulses. Exam reveals no gallop and no friction rub.   No murmur heard.  Pulmonary/Chest: Effort normal and breath sounds normal. No stridor. No respiratory distress. She has no wheezes. She has no " rales. Chest wall is not dull to percussion. She exhibits no mass, no tenderness, no bony tenderness, no laceration, no crepitus, no edema, no deformity, no swelling and no retraction. Right breast exhibits no inverted nipple, no mass, no nipple discharge, no skin change and no tenderness. Left breast exhibits no inverted nipple, no mass, no nipple discharge, no skin change and no tenderness. No breast swelling, tenderness, discharge or bleeding. Breasts are symmetrical.   Abdominal: Soft. Bowel sounds are normal. She exhibits no distension and no mass. There is no tenderness. There is no rebound and no guarding. No hernia.   Genitourinary: Rectum normal. No breast swelling, tenderness, discharge or bleeding. Pelvic exam was performed with patient supine. Uterus is not deviated, not enlarged, not fixed and not tender. Cervix exhibits no motion tenderness, no discharge and no friability. Right adnexum displays no mass. Left adnexum displays no mass.   Genitourinary Comments: Deferred per patient request hx of same    Musculoskeletal: Normal range of motion. She exhibits tenderness. She exhibits no edema or deformity.   Distal edema non pitting -bilaterally    Lymphadenopathy:     She has no cervical adenopathy.   Neurological: She is alert and oriented to person, place, and time. She has normal reflexes. She displays normal reflexes. No cranial nerve deficit or sensory deficit. She exhibits normal muscle tone. Coordination normal.   Skin: Skin is warm and dry. Rash noted. She is not diaphoretic. No erythema. No pallor.   RASH TO ABDOMEN AND RIGHT ARM    Psychiatric: She has a normal mood and affect. Her behavior is normal. Judgment and thought content normal.   Nursing note and vitals reviewed.      Assessment/Plan   Problem List Items Addressed This Visit        Other    Leg edema - Primary           New Medications Ordered This Visit   Medications   • verapamil SR (CALAN-SR) 240 MG CR tablet     Sig: Take 1 tablet  by mouth 2 (Two) Times a Day.     Dispense:  60 tablet     Refill:  11   • hydroCHLOROthiazide (HYDRODIURIL) 25 MG tablet     Si-3 times a week prn     Dispense:  30 tablet     Refill:  11       It's not just what you eat, but when you eat  Eat breakfast, and eat smaller meals throughout the day. A healthy breakfast can jumpstart your metabolism, while eating small, healthy meals (rather than the standard three large meals) keeps your energy up.   Avoid eating at night. Try to eat dinner earlier and fast for 14-16 hours until breakfast the next morning. Studies suggest that eating only when you’re most active and giving your digestive system a long break each day may help to regulate weight.     Add hctz 2-3 times a week prn edema of legs meds as directed   Diet discussed, meds -no salt -low carb diet , no caffeine , low calorie -weight loss suggested

## 2020-07-06 ENCOUNTER — LAB (OUTPATIENT)
Dept: LAB | Facility: HOSPITAL | Age: 45
End: 2020-07-06

## 2020-07-06 ENCOUNTER — OFFICE VISIT (OUTPATIENT)
Dept: FAMILY MEDICINE CLINIC | Facility: CLINIC | Age: 45
End: 2020-07-06

## 2020-07-06 VITALS
TEMPERATURE: 97.8 F | BODY MASS INDEX: 33.12 KG/M2 | WEIGHT: 211 LBS | HEIGHT: 67 IN | SYSTOLIC BLOOD PRESSURE: 128 MMHG | DIASTOLIC BLOOD PRESSURE: 82 MMHG

## 2020-07-06 DIAGNOSIS — Z00.00 WELLNESS EXAMINATION: Primary | ICD-10-CM

## 2020-07-06 DIAGNOSIS — E66.3 OVER WEIGHT: ICD-10-CM

## 2020-07-06 LAB
25(OH)D3 SERPL-MCNC: 33.7 NG/ML (ref 30–100)
ALBUMIN SERPL-MCNC: 4.1 G/DL (ref 3.5–5.2)
ALBUMIN/GLOB SERPL: 1.3 G/DL
ALP SERPL-CCNC: 76 U/L (ref 39–117)
ALT SERPL W P-5'-P-CCNC: 16 U/L (ref 1–33)
ANION GAP SERPL CALCULATED.3IONS-SCNC: 9.2 MMOL/L (ref 5–15)
AST SERPL-CCNC: 17 U/L (ref 1–32)
BASOPHILS # BLD AUTO: 0.07 10*3/MM3 (ref 0–0.2)
BASOPHILS NFR BLD AUTO: 0.7 % (ref 0–1.5)
BILIRUB SERPL-MCNC: 0.2 MG/DL (ref 0–1.2)
BUN SERPL-MCNC: 11 MG/DL (ref 6–20)
BUN/CREAT SERPL: 9.9 (ref 7–25)
CALCIUM SPEC-SCNC: 9.3 MG/DL (ref 8.6–10.5)
CHLORIDE SERPL-SCNC: 104 MMOL/L (ref 98–107)
CHOLEST SERPL-MCNC: 182 MG/DL (ref 0–200)
CO2 SERPL-SCNC: 25.8 MMOL/L (ref 22–29)
CREAT SERPL-MCNC: 1.11 MG/DL (ref 0.57–1)
DEPRECATED RDW RBC AUTO: 43.9 FL (ref 37–54)
EOSINOPHIL # BLD AUTO: 0.38 10*3/MM3 (ref 0–0.4)
EOSINOPHIL NFR BLD AUTO: 3.9 % (ref 0.3–6.2)
ERYTHROCYTE [DISTWIDTH] IN BLOOD BY AUTOMATED COUNT: 13.4 % (ref 12.3–15.4)
GFR SERPL CREATININE-BSD FRML MDRD: 64 ML/MIN/1.73
GLOBULIN UR ELPH-MCNC: 3.1 GM/DL
GLUCOSE SERPL-MCNC: 81 MG/DL (ref 65–99)
HBA1C MFR BLD: 5.48 % (ref 4.8–5.6)
HCT VFR BLD AUTO: 41.6 % (ref 34–46.6)
HDLC SERPL-MCNC: 54 MG/DL (ref 40–60)
HGB BLD-MCNC: 14.4 G/DL (ref 12–15.9)
IMM GRANULOCYTES # BLD AUTO: 0.03 10*3/MM3 (ref 0–0.05)
IMM GRANULOCYTES NFR BLD AUTO: 0.3 % (ref 0–0.5)
IRON 24H UR-MRATE: 70 MCG/DL (ref 37–145)
LDLC SERPL CALC-MCNC: 89 MG/DL (ref 0–100)
LDLC/HDLC SERPL: 1.66 {RATIO}
LYMPHOCYTES # BLD AUTO: 2.01 10*3/MM3 (ref 0.7–3.1)
LYMPHOCYTES NFR BLD AUTO: 20.4 % (ref 19.6–45.3)
MAGNESIUM SERPL-MCNC: 2.2 MG/DL (ref 1.6–2.6)
MCH RBC QN AUTO: 31.4 PG (ref 26.6–33)
MCHC RBC AUTO-ENTMCNC: 34.6 G/DL (ref 31.5–35.7)
MCV RBC AUTO: 90.6 FL (ref 79–97)
MONOCYTES # BLD AUTO: 0.7 10*3/MM3 (ref 0.1–0.9)
MONOCYTES NFR BLD AUTO: 7.1 % (ref 5–12)
NEUTROPHILS NFR BLD AUTO: 6.64 10*3/MM3 (ref 1.7–7)
NEUTROPHILS NFR BLD AUTO: 67.6 % (ref 42.7–76)
NRBC BLD AUTO-RTO: 0 /100 WBC (ref 0–0.2)
PLATELET # BLD AUTO: 257 10*3/MM3 (ref 140–450)
PMV BLD AUTO: 11.4 FL (ref 6–12)
POTASSIUM SERPL-SCNC: 3.7 MMOL/L (ref 3.5–5.2)
PROT SERPL-MCNC: 7.2 G/DL (ref 6–8.5)
RBC # BLD AUTO: 4.59 10*6/MM3 (ref 3.77–5.28)
SODIUM SERPL-SCNC: 139 MMOL/L (ref 136–145)
TRIGL SERPL-MCNC: 193 MG/DL (ref 0–150)
TSH SERPL DL<=0.05 MIU/L-ACNC: 1.18 UIU/ML (ref 0.27–4.2)
VIT B12 BLD-MCNC: 575 PG/ML (ref 211–946)
VLDLC SERPL-MCNC: 38.6 MG/DL (ref 5–40)
WBC # BLD AUTO: 9.83 10*3/MM3 (ref 3.4–10.8)

## 2020-07-06 PROCEDURE — 83036 HEMOGLOBIN GLYCOSYLATED A1C: CPT | Performed by: NURSE PRACTITIONER

## 2020-07-06 PROCEDURE — 82306 VITAMIN D 25 HYDROXY: CPT | Performed by: NURSE PRACTITIONER

## 2020-07-06 PROCEDURE — 80061 LIPID PANEL: CPT | Performed by: NURSE PRACTITIONER

## 2020-07-06 PROCEDURE — 83540 ASSAY OF IRON: CPT | Performed by: NURSE PRACTITIONER

## 2020-07-06 PROCEDURE — 83735 ASSAY OF MAGNESIUM: CPT | Performed by: NURSE PRACTITIONER

## 2020-07-06 PROCEDURE — 93010 ELECTROCARDIOGRAM REPORT: CPT | Performed by: INTERNAL MEDICINE

## 2020-07-06 PROCEDURE — 80053 COMPREHEN METABOLIC PANEL: CPT | Performed by: NURSE PRACTITIONER

## 2020-07-06 PROCEDURE — 82607 VITAMIN B-12: CPT | Performed by: NURSE PRACTITIONER

## 2020-07-06 PROCEDURE — 93005 ELECTROCARDIOGRAM TRACING: CPT | Performed by: NURSE PRACTITIONER

## 2020-07-06 PROCEDURE — 85025 COMPLETE CBC W/AUTO DIFF WBC: CPT | Performed by: NURSE PRACTITIONER

## 2020-07-06 PROCEDURE — 36415 COLL VENOUS BLD VENIPUNCTURE: CPT | Performed by: NURSE PRACTITIONER

## 2020-07-06 PROCEDURE — 99396 PREV VISIT EST AGE 40-64: CPT | Performed by: NURSE PRACTITIONER

## 2020-07-06 PROCEDURE — 84443 ASSAY THYROID STIM HORMONE: CPT | Performed by: NURSE PRACTITIONER

## 2020-07-06 NOTE — PROGRESS NOTES
Chief Complaint   Patient presents with   • Wellness Exam     Subjective   Marissa Bunch is a 45 y.o. female.     Presents with needing wellness exam, history of insomnia and overweight, needing labs     Insomnia   This is a chronic problem. The current episode started more than 1 year ago. The problem occurs intermittently. The problem has been rapidly improving. Associated symptoms include fatigue. Pertinent negatives include no arthralgias, rash, sore throat, swollen glands, urinary symptoms or visual change. Treatments tried: ambien  The treatment provided mild relief.   Obesity   This is a chronic problem. The current episode started more than 1 month ago. Associated symptoms include fatigue. Pertinent negatives include no arthralgias, rash, sore throat, swollen glands, urinary symptoms or visual change. Treatments tried: LOW CALORIE, LOW CARBS  The treatment provided mild relief.        The following portions of the patient's history were reviewed and updated as appropriate: allergies, current medications, past social history and problem list.    Review of Systems   Constitutional: Positive for activity change, appetite change, fatigue and unexpected weight change.   HENT: Negative.  Negative for sore throat.    Eyes: Negative.  Negative for photophobia and visual disturbance.   Respiratory: Negative.    Cardiovascular: Negative.    Gastrointestinal: Negative.    Endocrine: Negative.    Genitourinary: Negative.    Musculoskeletal: Negative.  Negative for arthralgias and back pain.   Skin: Negative.  Negative for rash.   Allergic/Immunologic: Negative.  Negative for environmental allergies, food allergies and immunocompromised state.   Neurological: Negative.    Hematological: Negative.  Negative for adenopathy. Does not bruise/bleed easily.   Psychiatric/Behavioral: Negative for agitation, behavioral problems, confusion and decreased concentration. The patient has insomnia.        Objective   /82  "  Temp 97.8 °F (36.6 °C) (Tympanic)   Ht 170.2 cm (67\")   Wt 95.7 kg (211 lb)   BMI 33.05 kg/m²   Physical Exam   Constitutional: She is oriented to person, place, and time. She appears well-developed and well-nourished. No distress.   HENT:   Head: Normocephalic and atraumatic.   Right Ear: External ear normal.   Left Ear: External ear normal.   Mouth/Throat: Oropharynx is clear and moist. No oropharyngeal exudate.   Eyes: Pupils are equal, round, and reactive to light. Conjunctivae and EOM are normal. Right eye exhibits no discharge. Left eye exhibits no discharge. No scleral icterus.   Neck: Normal range of motion. Neck supple. No JVD present. No tracheal deviation present. No thyromegaly present.   Cardiovascular: Normal rate, regular rhythm, normal heart sounds, intact distal pulses and normal pulses. Exam reveals no gallop and no friction rub.   No murmur heard.  Pulmonary/Chest: Effort normal and breath sounds normal. No stridor. No respiratory distress. She has no wheezes. She has no rales. Chest wall is not dull to percussion. She exhibits no mass, no tenderness, no bony tenderness, no laceration, no crepitus, no edema, no deformity, no swelling and no retraction. Right breast exhibits no inverted nipple, no mass, no nipple discharge, no skin change and no tenderness. Left breast exhibits no inverted nipple, no mass, no nipple discharge, no skin change and no tenderness. No breast swelling, tenderness, discharge or bleeding. Breasts are symmetrical.   Abdominal: Soft. Bowel sounds are normal. She exhibits no distension and no mass. There is no tenderness. There is no rebound and no guarding. No hernia.   Genitourinary: No breast swelling, tenderness, discharge or bleeding.   Genitourinary Comments: Deferred per patient request hx of same    Musculoskeletal: Normal range of motion. She exhibits tenderness. She exhibits no edema or deformity.   Lymphadenopathy:     She has no cervical adenopathy. "   Neurological: She is alert and oriented to person, place, and time. She has normal reflexes. She displays normal reflexes. No cranial nerve deficit or sensory deficit. She exhibits normal muscle tone. Coordination normal.   Skin: Skin is warm and dry. Rash noted. She is not diaphoretic. No erythema. No pallor.   RASH TO ABDOMEN AND RIGHT ARM    Psychiatric: She has a normal mood and affect. Her behavior is normal. Judgment and thought content normal.   Nursing note and vitals reviewed.      Assessment/Plan   Problem List Items Addressed This Visit        Other    Wellness examination - Primary    Relevant Orders    CBC & Differential    Comprehensive Metabolic Panel    Hemoglobin A1c    Iron    Lipid Panel    Vitamin D 25 Hydroxy    Vitamin B12    TSH    Magnesium    Over weight    Relevant Orders    ECG 12 Lead (Completed)           New Medications Ordered This Visit   Medications   • mometasone-formoterol (DULERA 100) 100-5 MCG/ACT inhaler     Sig: Inhale 2 puffs 2 (Two) Times a Day.     Dispense:  13 g     Refill:  11   • naltrexone-bupropion ER (CONTRAVE) 8-90 MG tablet     Sig: Take 1 tablet by mouth 2 (Two) Times a Day.     Dispense:  60 tablet     Refill:  5       It's not just what you eat, but when you eat  Eat breakfast, and eat smaller meals throughout the day. A healthy breakfast can jumpstart your metabolism, while eating small, healthy meals (rather than the standard three large meals) keeps your energy up.   Avoid eating at night. Try to eat dinner earlier and fast for 14-16 hours until breakfast the next morning. Studies suggest that eating only when you’re most active and giving your digestive system a long break each day may help to regulate weight.     Labs today, diet discussed, low calorie, no fast foods, no fried foods, contrave as directed, I do not suggest phenteramine due to gisele on ekg and beta blocker

## 2020-07-08 ENCOUNTER — TELEPHONE (OUTPATIENT)
Dept: FAMILY MEDICINE CLINIC | Facility: CLINIC | Age: 45
End: 2020-07-08

## 2020-07-08 NOTE — PROGRESS NOTES
Per JOSE Mcdowell, Ms. Bunch  has been called with recent lab results & recommendations.  Continue current medications and follow-up as planned or sooner if any problems.

## 2020-07-08 NOTE — TELEPHONE ENCOUNTER
Per JOSE Mcdowell, Ms. Bunch  has been called with recent lab results & recommendations.  Continue current medications and follow-up as planned or sooner if any problems.      ----- Message from JOSE Richey sent at 7/8/2020  8:03 AM CDT -----  Can you let her know labs are good except renal function is elevated-needs to avoid nsaids and drinks more water, no cokes or tea   Also triglycerides are high-directly diet related

## 2020-07-14 ENCOUNTER — TELEPHONE (OUTPATIENT)
Dept: FAMILY MEDICINE CLINIC | Facility: CLINIC | Age: 45
End: 2020-07-14

## 2020-07-14 NOTE — TELEPHONE ENCOUNTER
Breonna,    I did call her to let her know it would be about a week before this would be taken care of and that you would get it done upon your return to work.     LIZZETH Saunders

## 2020-07-14 NOTE — TELEPHONE ENCOUNTER
Caller: Marissa Bunch    Relationship to patient: Self    Best call back number: [phone]    Patient is needing: PATIENT IS NEEDING A PRIOR AUTHORIZATION FOR CONTRAVE. PATIENT GAVE THE PHONE NUMBER: [phone]

## 2020-07-15 ENCOUNTER — TELEPHONE (OUTPATIENT)
Dept: FAMILY MEDICINE CLINIC | Facility: CLINIC | Age: 45
End: 2020-07-15

## 2020-07-21 RX ORDER — ELETRIPTAN HYDROBROMIDE 40 MG/1
40 TABLET, FILM COATED ORAL DAILY PRN
Qty: 18 TABLET | Refills: 5 | Status: SHIPPED | OUTPATIENT
Start: 2020-07-21 | End: 2021-06-03

## 2020-08-17 RX ORDER — ERENUMAB-AOOE 70 MG/ML
INJECTION SUBCUTANEOUS
Qty: 1 ML | Refills: 2 | Status: SHIPPED | OUTPATIENT
Start: 2020-08-17 | End: 2020-11-20

## 2020-09-04 ENCOUNTER — TELEPHONE (OUTPATIENT)
Dept: FAMILY MEDICINE CLINIC | Facility: CLINIC | Age: 45
End: 2020-09-04

## 2020-09-04 RX ORDER — FLUCONAZOLE 150 MG/1
TABLET ORAL
Qty: 3 TABLET | Refills: 0 | Status: SHIPPED | OUTPATIENT
Start: 2020-09-04 | End: 2020-12-04

## 2020-09-04 NOTE — TELEPHONE ENCOUNTER
Caller: Marissa Bunch    Relationship: Self    Best call back number: [phone]    What medication are you requesting: YEAST INFECTION MEDICATION    What are your current symptoms: YEAST INFECTION    How long have you been experiencing symptoms: 3-4 DAYS    If a prescription is needed, what is your preferred pharmacy and phone number:  [facility] #[id number] [address] AT [facility] [address] [facility] - [phone]  - [phone] FX  [phone]      GAVE NO ADDITIONAL INFORMATION EXCEPT THAT SHE HAD A YEAST INFECTION, YEAST INFECTION SYMPTOMS AND NEEDED YEAST INFECTION MEDICATION.

## 2020-10-12 RX ORDER — ZOLPIDEM TARTRATE 10 MG/1
10 TABLET ORAL NIGHTLY PRN
Qty: 30 TABLET | Refills: 2 | Status: SHIPPED | OUTPATIENT
Start: 2020-10-12 | End: 2021-05-03 | Stop reason: SDUPTHER

## 2020-10-12 NOTE — TELEPHONE ENCOUNTER
Ms Shruthi. Marissa Bunch is requesting a refill on her Ambien 10 mg #30    Last OV    07/06/2020     Next Emma OV    Visit date not found    Last Script Written 09/11/2019  #30, 2 refills      Last Last     07/08/2020    Please advise on refill    Thank you

## 2020-10-30 LAB
QT INTERVAL: 456 MS
QTC INTERVAL: 432 MS

## 2020-11-20 RX ORDER — ERENUMAB-AOOE 70 MG/ML
INJECTION SUBCUTANEOUS
Qty: 1 ML | Refills: 2 | Status: SHIPPED | OUTPATIENT
Start: 2020-11-20 | End: 2021-07-12

## 2020-12-04 ENCOUNTER — TELEPHONE (OUTPATIENT)
Dept: FAMILY MEDICINE CLINIC | Facility: CLINIC | Age: 45
End: 2020-12-04

## 2020-12-04 NOTE — TELEPHONE ENCOUNTER
PATIENT CALLING IN STATES THAT SHE HAS COVID SYMPTOMS YESTERDAY AND WOKE UP TODAY FEELING FINE BUT WORK IS WANTING A DR'S EXCUSE TO RETURN.     PLEASE CONTACT PATIENT TO ADVISE.

## 2020-12-18 ENCOUNTER — TELEPHONE (OUTPATIENT)
Dept: FAMILY MEDICINE CLINIC | Facility: CLINIC | Age: 45
End: 2020-12-18

## 2020-12-18 NOTE — TELEPHONE ENCOUNTER
Patient states a prior authorization is needed for her Contrave and Eletriptan. She states her insurance company has faxed this information over. She would like to know the status of this ? Please advise.       Callback: [phone]

## 2020-12-30 PROCEDURE — U0003 INFECTIOUS AGENT DETECTION BY NUCLEIC ACID (DNA OR RNA); SEVERE ACUTE RESPIRATORY SYNDROME CORONAVIRUS 2 (SARS-COV-2) (CORONAVIRUS DISEASE [COVID-19]), AMPLIFIED PROBE TECHNIQUE, MAKING USE OF HIGH THROUGHPUT TECHNOLOGIES AS DESCRIBED BY CMS-2020-01-R: HCPCS | Performed by: NURSE PRACTITIONER

## 2021-04-01 RX ORDER — ALBUTEROL SULFATE 90 UG/1
AEROSOL, METERED RESPIRATORY (INHALATION)
Qty: 6.7 G | Refills: 5 | Status: SHIPPED | OUTPATIENT
Start: 2021-04-01 | End: 2022-01-03

## 2021-04-07 RX ORDER — PAROXETINE 10 MG/1
10 TABLET, FILM COATED ORAL EVERY MORNING
Qty: 30 TABLET | Refills: 11 | Status: SHIPPED | OUTPATIENT
Start: 2021-04-07 | End: 2022-04-04

## 2021-04-07 RX ORDER — VERAPAMIL HYDROCHLORIDE 240 MG/1
TABLET, FILM COATED, EXTENDED RELEASE ORAL
Qty: 60 TABLET | Refills: 11 | Status: SHIPPED | OUTPATIENT
Start: 2021-04-07 | End: 2022-04-26

## 2021-05-03 DIAGNOSIS — F51.01 PRIMARY INSOMNIA: Primary | ICD-10-CM

## 2021-05-03 RX ORDER — ZOLPIDEM TARTRATE 10 MG/1
10 TABLET ORAL NIGHTLY PRN
Qty: 14 TABLET | Refills: 0 | Status: SHIPPED | OUTPATIENT
Start: 2021-05-03 | End: 2021-07-12 | Stop reason: SDUPTHER

## 2021-05-03 RX ORDER — ZOLPIDEM TARTRATE 10 MG/1
10 TABLET ORAL NIGHTLY PRN
Qty: 14 TABLET | Refills: 0 | Status: SHIPPED | OUTPATIENT
Start: 2021-05-03 | End: 2021-05-03 | Stop reason: SDUPTHER

## 2021-05-21 ENCOUNTER — TELEPHONE (OUTPATIENT)
Dept: FAMILY MEDICINE CLINIC | Facility: CLINIC | Age: 46
End: 2021-05-21

## 2021-05-24 RX ORDER — METRONIDAZOLE 500 MG/1
500 TABLET ORAL 2 TIMES DAILY
Qty: 14 TABLET | Refills: 0 | Status: SHIPPED | OUTPATIENT
Start: 2021-05-24 | End: 2021-07-12

## 2021-06-03 RX ORDER — ELETRIPTAN HYDROBROMIDE 40 MG/1
40 TABLET, FILM COATED ORAL DAILY PRN
Qty: 18 TABLET | Refills: 0 | Status: SHIPPED | OUTPATIENT
Start: 2021-06-03 | End: 2021-07-12 | Stop reason: SDUPTHER

## 2021-07-12 ENCOUNTER — OFFICE VISIT (OUTPATIENT)
Dept: FAMILY MEDICINE CLINIC | Facility: CLINIC | Age: 46
End: 2021-07-12

## 2021-07-12 ENCOUNTER — LAB (OUTPATIENT)
Dept: LAB | Facility: HOSPITAL | Age: 46
End: 2021-07-12

## 2021-07-12 VITALS
HEIGHT: 67 IN | DIASTOLIC BLOOD PRESSURE: 88 MMHG | WEIGHT: 207 LBS | HEART RATE: 69 BPM | SYSTOLIC BLOOD PRESSURE: 135 MMHG | OXYGEN SATURATION: 98 % | BODY MASS INDEX: 32.49 KG/M2

## 2021-07-12 DIAGNOSIS — Z01.419 GYNECOLOGIC EXAM NORMAL: Primary | ICD-10-CM

## 2021-07-12 DIAGNOSIS — Z00.00 GENERAL MEDICAL EXAM: Primary | ICD-10-CM

## 2021-07-12 DIAGNOSIS — Z12.31 VISIT FOR SCREENING MAMMOGRAM: ICD-10-CM

## 2021-07-12 DIAGNOSIS — F51.01 PRIMARY INSOMNIA: ICD-10-CM

## 2021-07-12 DIAGNOSIS — Z00.00 GENERAL MEDICAL EXAM: ICD-10-CM

## 2021-07-12 LAB
BASOPHILS # BLD AUTO: 0.08 10*3/MM3 (ref 0–0.2)
BASOPHILS NFR BLD AUTO: 1.1 % (ref 0–1.5)
DEPRECATED RDW RBC AUTO: 44.3 FL (ref 37–54)
EOSINOPHIL # BLD AUTO: 0.65 10*3/MM3 (ref 0–0.4)
EOSINOPHIL NFR BLD AUTO: 8.8 % (ref 0.3–6.2)
ERYTHROCYTE [DISTWIDTH] IN BLOOD BY AUTOMATED COUNT: 13.2 % (ref 12.3–15.4)
HBA1C MFR BLD: 5.6 % (ref 4.8–5.6)
HCT VFR BLD AUTO: 46.6 % (ref 34–46.6)
HGB BLD-MCNC: 15.9 G/DL (ref 12–15.9)
IMM GRANULOCYTES # BLD AUTO: 0.02 10*3/MM3 (ref 0–0.05)
IMM GRANULOCYTES NFR BLD AUTO: 0.3 % (ref 0–0.5)
LYMPHOCYTES # BLD AUTO: 1.69 10*3/MM3 (ref 0.7–3.1)
LYMPHOCYTES NFR BLD AUTO: 22.8 % (ref 19.6–45.3)
MCH RBC QN AUTO: 30.9 PG (ref 26.6–33)
MCHC RBC AUTO-ENTMCNC: 34.1 G/DL (ref 31.5–35.7)
MCV RBC AUTO: 90.7 FL (ref 79–97)
MONOCYTES # BLD AUTO: 0.55 10*3/MM3 (ref 0.1–0.9)
MONOCYTES NFR BLD AUTO: 7.4 % (ref 5–12)
NEUTROPHILS NFR BLD AUTO: 4.43 10*3/MM3 (ref 1.7–7)
NEUTROPHILS NFR BLD AUTO: 59.6 % (ref 42.7–76)
NRBC BLD AUTO-RTO: 0 /100 WBC (ref 0–0.2)
PLATELET # BLD AUTO: 267 10*3/MM3 (ref 140–450)
PMV BLD AUTO: 11.1 FL (ref 6–12)
RBC # BLD AUTO: 5.14 10*6/MM3 (ref 3.77–5.28)
WBC # BLD AUTO: 7.42 10*3/MM3 (ref 3.4–10.8)

## 2021-07-12 PROCEDURE — 85025 COMPLETE CBC W/AUTO DIFF WBC: CPT | Performed by: NURSE PRACTITIONER

## 2021-07-12 PROCEDURE — 83036 HEMOGLOBIN GLYCOSYLATED A1C: CPT | Performed by: NURSE PRACTITIONER

## 2021-07-12 PROCEDURE — 82607 VITAMIN B-12: CPT | Performed by: NURSE PRACTITIONER

## 2021-07-12 PROCEDURE — 82306 VITAMIN D 25 HYDROXY: CPT | Performed by: NURSE PRACTITIONER

## 2021-07-12 PROCEDURE — 36415 COLL VENOUS BLD VENIPUNCTURE: CPT | Performed by: NURSE PRACTITIONER

## 2021-07-12 PROCEDURE — 99396 PREV VISIT EST AGE 40-64: CPT | Performed by: NURSE PRACTITIONER

## 2021-07-12 PROCEDURE — 83540 ASSAY OF IRON: CPT | Performed by: NURSE PRACTITIONER

## 2021-07-12 PROCEDURE — 80061 LIPID PANEL: CPT | Performed by: NURSE PRACTITIONER

## 2021-07-12 PROCEDURE — 84443 ASSAY THYROID STIM HORMONE: CPT | Performed by: NURSE PRACTITIONER

## 2021-07-12 PROCEDURE — 86803 HEPATITIS C AB TEST: CPT | Performed by: NURSE PRACTITIONER

## 2021-07-12 PROCEDURE — 80053 COMPREHEN METABOLIC PANEL: CPT | Performed by: NURSE PRACTITIONER

## 2021-07-12 RX ORDER — ELETRIPTAN HYDROBROMIDE 40 MG/1
40 TABLET, FILM COATED ORAL DAILY PRN
Qty: 12 TABLET | Refills: 5 | Status: SHIPPED | OUTPATIENT
Start: 2021-07-12 | End: 2022-03-22

## 2021-07-12 RX ORDER — CLINDAMYCIN PHOSPHATE 100 MG/1
100 SUPPOSITORY VAGINAL NIGHTLY
Qty: 3 SUPPOSITORY | Refills: 5 | Status: SHIPPED | OUTPATIENT
Start: 2021-07-12 | End: 2022-08-08 | Stop reason: SDUPTHER

## 2021-07-12 RX ORDER — ELETRIPTAN HYDROBROMIDE 40 MG/1
40 TABLET, FILM COATED ORAL DAILY PRN
Qty: 18 TABLET | Refills: 5 | Status: SHIPPED | OUTPATIENT
Start: 2021-07-12 | End: 2021-07-12 | Stop reason: SDUPTHER

## 2021-07-12 RX ORDER — ZOLPIDEM TARTRATE 10 MG/1
10 TABLET ORAL NIGHTLY PRN
Qty: 30 TABLET | Refills: 2 | Status: SHIPPED | OUTPATIENT
Start: 2021-07-12 | End: 2022-02-23 | Stop reason: SDUPTHER

## 2021-07-12 NOTE — PROGRESS NOTES
Chief Complaint   Patient presents with   • Annual Exam     Subjective   Marissa Bunch is a 46 y.o. female.     Presents with needing wellness exam, history of insomnia and gyn with mammogram , needing labs     Insomnia  This is a chronic problem. The current episode started more than 1 year ago. The problem occurs intermittently. The problem has been rapidly improving. Treatments tried: ambien  The treatment provided mild relief.   Gynecologic Exam  The patient's primary symptoms include vaginal discharge. The problem has been resolved. She is not pregnant. Pertinent negatives include no back pain, dysuria, flank pain, frequency, joint swelling or painful intercourse. She is sexually active. No, her partner does not have an STD. She is postmenopausal. There is no history of an abdominal surgery, a  section, miscarriage or ovarian cysts.        The following portions of the patient's history were reviewed and updated as appropriate: allergies, current medications, past social history and problem list.    Review of Systems   Constitutional: Positive for activity change, appetite change and unexpected weight change.   HENT: Negative.  Negative for dental problem and drooling.    Eyes: Negative for photophobia, pain, discharge, redness, itching and visual disturbance.   Respiratory: Negative.  Negative for apnea, choking, chest tightness, wheezing and stridor.    Cardiovascular: Negative.  Negative for palpitations and leg swelling.   Gastrointestinal: Negative.  Negative for abdominal distention and anal bleeding.   Endocrine: Negative.  Negative for heat intolerance, polydipsia, polyphagia and polyuria.   Genitourinary: Positive for vaginal discharge. Negative for dysuria, flank pain and frequency.        Reports vaginal discharge on and off and likes to have meds available if needed   Musculoskeletal: Negative.  Negative for back pain.   Skin: Negative.    Allergic/Immunologic: Negative.  Negative  "for environmental allergies, food allergies and immunocompromised state.   Neurological: Negative.  Negative for dizziness, tremors, seizures, syncope, facial asymmetry and speech difficulty.   Hematological: Negative.  Negative for adenopathy. Does not bruise/bleed easily.   Psychiatric/Behavioral: Negative for agitation, behavioral problems, confusion and decreased concentration. The patient has insomnia.        Objective   /88 (BP Location: Left arm)   Pulse 69   Ht 170.2 cm (67\")   Wt 93.9 kg (207 lb)   SpO2 98%   BMI 32.42 kg/m²   Physical Exam  Vitals and nursing note reviewed.   Constitutional:       Appearance: Normal appearance.   HENT:      Head: Normocephalic.      Right Ear: Tympanic membrane normal. There is no impacted cerumen.      Left Ear: There is no impacted cerumen.      Nose: Nose normal. No congestion or rhinorrhea.      Mouth/Throat:      Mouth: Mucous membranes are moist.   Eyes:      Pupils: Pupils are equal, round, and reactive to light.   Cardiovascular:      Rate and Rhythm: Normal rate and regular rhythm.      Pulses: Normal pulses.   Pulmonary:      Effort: Pulmonary effort is normal.      Breath sounds: Normal breath sounds.   Chest:      Breasts:         Right: Normal.         Left: Normal.   Abdominal:      General: Abdomen is flat.   Genitourinary:     Vagina: Normal. No signs of injury and foreign body. No vaginal discharge, erythema, tenderness, bleeding, lesions or prolapsed vaginal walls.      Cervix: No cervical motion tenderness, discharge, friability, lesion, erythema, cervical bleeding or eversion.      Uterus: Normal.       Adnexa: Right adnexa normal and left adnexa normal.      Rectum: Normal.      Comments: No discharge today   Musculoskeletal:         General: No swelling, tenderness, deformity or signs of injury.      Cervical back: Normal range of motion.      Right lower leg: No edema.      Left lower leg: No edema.   Lymphadenopathy:      Upper Body:      " Right upper body: No supraclavicular or axillary adenopathy.      Left upper body: No supraclavicular or axillary adenopathy.   Skin:     General: Skin is warm.      Coloration: Skin is not jaundiced or pale.      Findings: No bruising, erythema, lesion or rash.   Neurological:      General: No focal deficit present.      Mental Status: She is alert and oriented to person, place, and time.      Cranial Nerves: No cranial nerve deficit.      Sensory: No sensory deficit.      Motor: No weakness.      Coordination: Coordination normal.   Psychiatric:         Mood and Affect: Mood normal.         Assessment/Plan   Problems Addressed this Visit        Genitourinary and Reproductive     Gynecologic exam normal - Primary    Relevant Orders    CBC & Differential    Comprehensive Metabolic Panel    Hemoglobin A1c    Hepatitis C Antibody    Lipid Panel    Iron    Vitamin D 25 Hydroxy    Vitamin B12    TSH       Sleep    Insomnia    Relevant Medications    zolpidem (Ambien) 10 MG tablet      Other Visit Diagnoses     Visit for screening mammogram        Relevant Orders    Mammo Screening Digital Tomosynthesis Bilateral With CAD    CBC & Differential    Comprehensive Metabolic Panel    Hemoglobin A1c    Hepatitis C Antibody    Lipid Panel    Iron    Vitamin D 25 Hydroxy    Vitamin B12    TSH    General medical exam        Relevant Orders    CBC & Differential    Comprehensive Metabolic Panel    Hemoglobin A1c    Hepatitis C Antibody    Lipid Panel    Iron    Vitamin D 25 Hydroxy    Vitamin B12    TSH      Diagnoses       Codes Comments    Gynecologic exam normal    -  Primary ICD-10-CM: Z01.419  ICD-9-CM: V72.31     Primary insomnia     ICD-10-CM: F51.01  ICD-9-CM: 307.42     Visit for screening mammogram     ICD-10-CM: Z12.31  ICD-9-CM: V76.12     General medical exam     ICD-10-CM: Z00.00  ICD-9-CM: V70.9            New Medications Ordered This Visit   Medications   • eletriptan (RELPAX) 40 MG tablet     Sig: Take 1 tablet by  mouth Daily As Needed for Headache. may repeat in 2 hours if necessary     Dispense:  12 tablet     Refill:  5   • clindamycin (Cleocin) 100 MG vaginal suppository     Sig: Insert 1 suppository into the vagina Every Night.     Dispense:  3 suppository     Refill:  5   • zolpidem (Ambien) 10 MG tablet     Sig: Take 1 tablet by mouth At Night As Needed for Sleep. Needs appointment for refills     Dispense:  30 tablet     Refill:  2       It's not just what you eat, but when you eat  Eat breakfast, and eat smaller meals throughout the day. A healthy breakfast can jumpstart your metabolism, while eating small, healthy meals (rather than the standard three large meals) keeps your energy up.   Avoid eating at night. Try to eat dinner earlier and fast for 14-16 hours until breakfast the next morning. Studies suggest that eating only when you’re most active and giving your digestive system a long break each day may help to regulate weight.     Refill meds, diet discussed, follow up if worsen  Mammogram today     Patient understands the risks associated with this controlled medication, including tolerance and addiction.  she also agrees to only obtain this medication from me, and not from a another provider, unless that provider is covering for me in my absence.  she also agrees to be compliant in dosing, and not self adjust the dose of medication.  A signed controlled substance agreement is on file, and she has received a controlled substance education sheet at this a previous visit.  she has also signed a consent for treatment with a controlled substance as per [facility] policy. OLYA was obtained.

## 2021-07-13 ENCOUNTER — TELEPHONE (OUTPATIENT)
Dept: FAMILY MEDICINE CLINIC | Facility: CLINIC | Age: 46
End: 2021-07-13

## 2021-07-13 LAB
25(OH)D3 SERPL-MCNC: 34.5 NG/ML (ref 30–100)
ALBUMIN SERPL-MCNC: 4.5 G/DL (ref 3.5–5.2)
ALBUMIN/GLOB SERPL: 1.5 G/DL
ALP SERPL-CCNC: 99 U/L (ref 39–117)
ALT SERPL W P-5'-P-CCNC: 24 U/L (ref 1–33)
ANION GAP SERPL CALCULATED.3IONS-SCNC: 14.1 MMOL/L (ref 5–15)
AST SERPL-CCNC: 24 U/L (ref 1–32)
BILIRUB SERPL-MCNC: 0.4 MG/DL (ref 0–1.2)
BUN SERPL-MCNC: 14 MG/DL (ref 6–20)
BUN/CREAT SERPL: 14 (ref 7–25)
CALCIUM SPEC-SCNC: 9.9 MG/DL (ref 8.6–10.5)
CHLORIDE SERPL-SCNC: 100 MMOL/L (ref 98–107)
CHOLEST SERPL-MCNC: 242 MG/DL (ref 0–200)
CO2 SERPL-SCNC: 28.9 MMOL/L (ref 22–29)
CREAT SERPL-MCNC: 1 MG/DL (ref 0.57–1)
GFR SERPL CREATININE-BSD FRML MDRD: 72 ML/MIN/1.73
GLOBULIN UR ELPH-MCNC: 3.1 GM/DL
GLUCOSE SERPL-MCNC: 96 MG/DL (ref 65–99)
HCV AB SER DONR QL: NORMAL
HDLC SERPL-MCNC: 56 MG/DL (ref 40–60)
IRON 24H UR-MRATE: 100 MCG/DL (ref 37–145)
LDLC SERPL CALC-MCNC: 157 MG/DL (ref 0–100)
LDLC/HDLC SERPL: 2.74 {RATIO}
POTASSIUM SERPL-SCNC: 5 MMOL/L (ref 3.5–5.2)
PROT SERPL-MCNC: 7.6 G/DL (ref 6–8.5)
SODIUM SERPL-SCNC: 143 MMOL/L (ref 136–145)
TRIGL SERPL-MCNC: 163 MG/DL (ref 0–150)
TSH SERPL DL<=0.05 MIU/L-ACNC: 0.87 UIU/ML (ref 0.27–4.2)
VIT B12 BLD-MCNC: 646 PG/ML (ref 211–946)
VLDLC SERPL-MCNC: 29 MG/DL (ref 5–40)

## 2021-07-13 NOTE — TELEPHONE ENCOUNTER
----- Message from JOSE Richey sent at 7/13/2021  8:09 Per JOSE Hawkins, Ms. Bunch has been called with recent Bilateral Screening Mammogram results & recommendations.  Continue current medications and follow-up as planned or sooner if any problems.         AM CDT -----  Regarding: FW:  Can you let her know her labs are good except cholesterol. No changes but needs to eat better  ----- Message -----  From: Lab, Background User  Sent: 7/12/2021   6:53 PM CDT  To: JOSE Richey

## 2021-07-13 NOTE — PROGRESS NOTES
Per JOSE Hawkins, Ms. Bunch has been called with recent Bilateral Screening Mammogram results & recommendations.  Continue current medications and follow-up as planned or sooner if any problems.

## 2021-07-13 NOTE — PROGRESS NOTES
Per  JOSE Hawkins, Ms. Bunch has been called with recent lab results & recommendations.  Continue current medications and follow-up as planned or sooner if any problems.

## 2021-07-13 NOTE — TELEPHONE ENCOUNTER
Per JOSE Hawkins, Ms. Bunch has been called with recent Bilateral Screening Mammogram and Lab results & recommendations.  Continue current medications and follow-up as planned or sooner if any problems.         ----- Message from JOSE Richey sent at 7/13/2021 12:59 PM CDT -----  Regarding: FW:  Can you let her know normal mammogram  ----- Message -----  From: Interface, Rad Results [place] In  Sent: 7/13/2021  12:10 PM CDT  To: JOSE Richey

## 2021-07-20 LAB
LAB AP CASE REPORT: NORMAL
PATH INTERP SPEC-IMP: NORMAL

## 2021-07-23 ENCOUNTER — TELEPHONE (OUTPATIENT)
Dept: FAMILY MEDICINE CLINIC | Facility: CLINIC | Age: 46
End: 2021-07-23

## 2021-07-23 NOTE — TELEPHONE ENCOUNTER
Per JOSE Hawkins, Ms. Bunch  has been called with recent lab results and recommendations.   Continue current medications and follow-up as planned or sooner if any problems.    She said to go ahead and send the script for the Lipitor to [facility].     Per JOSE Hawkins  Can you let her know labs are good except needs to consider adding lipitor 10mg at night-also her pap showed atyplical but neg hpv-needs repeat in 1 year       Breonna, you do not have to call Ms. Bunch, with her lab results that are in your work basket.  Results and Recommendations have been relayed to the patient, I do not have access to you basket to complete the note.     LIZZETH Saunders

## 2021-07-26 RX ORDER — ATORVASTATIN CALCIUM 10 MG/1
10 TABLET, FILM COATED ORAL NIGHTLY
Qty: 90 TABLET | Refills: 3 | Status: SHIPPED | OUTPATIENT
Start: 2021-07-26 | End: 2022-08-08 | Stop reason: SDUPTHER

## 2021-07-27 NOTE — TELEPHONE ENCOUNTER
PT is wanting to see if she can get something called in for a yeast infection.     Paul Kurtz [place]

## 2021-07-28 ENCOUNTER — TELEPHONE (OUTPATIENT)
Dept: FAMILY MEDICINE CLINIC | Facility: CLINIC | Age: 46
End: 2021-07-28

## 2021-07-28 RX ORDER — FLUCONAZOLE 150 MG/1
TABLET ORAL
Qty: 3 TABLET | Refills: 0 | Status: SHIPPED | OUTPATIENT
Start: 2021-07-28 | End: 2021-08-03 | Stop reason: SDUPTHER

## 2021-08-03 ENCOUNTER — OFFICE VISIT (OUTPATIENT)
Dept: FAMILY MEDICINE CLINIC | Facility: CLINIC | Age: 46
End: 2021-08-03

## 2021-08-03 VITALS
SYSTOLIC BLOOD PRESSURE: 130 MMHG | DIASTOLIC BLOOD PRESSURE: 80 MMHG | HEART RATE: 69 BPM | BODY MASS INDEX: 32.96 KG/M2 | TEMPERATURE: 98.9 F | WEIGHT: 210 LBS | OXYGEN SATURATION: 98 % | HEIGHT: 67 IN

## 2021-08-03 DIAGNOSIS — B37.31 YEAST VAGINITIS: ICD-10-CM

## 2021-08-03 DIAGNOSIS — R09.81 SINUS CONGESTION: ICD-10-CM

## 2021-08-03 DIAGNOSIS — R05.9 COUGH: Primary | ICD-10-CM

## 2021-08-03 PROCEDURE — 99213 OFFICE O/P EST LOW 20 MIN: CPT | Performed by: NURSE PRACTITIONER

## 2021-08-03 RX ORDER — BROMPHENIRAMINE MALEATE, PSEUDOEPHEDRINE HYDROCHLORIDE, AND DEXTROMETHORPHAN HYDROBROMIDE 2; 30; 10 MG/5ML; MG/5ML; MG/5ML
2.5 SYRUP ORAL 4 TIMES DAILY PRN
Qty: 118 ML | Refills: 0 | Status: SHIPPED | OUTPATIENT
Start: 2021-08-03 | End: 2022-02-23

## 2021-08-03 RX ORDER — FLUCONAZOLE 150 MG/1
TABLET ORAL
Qty: 3 TABLET | Refills: 0 | Status: SHIPPED | OUTPATIENT
Start: 2021-08-03 | End: 2022-03-15

## 2021-08-03 RX ORDER — METHYLPREDNISOLONE 4 MG/1
TABLET ORAL
Qty: 21 EACH | Refills: 0 | Status: SHIPPED | OUTPATIENT
Start: 2021-08-03 | End: 2022-01-17

## 2021-08-03 RX ORDER — CEFUROXIME AXETIL 500 MG/1
500 TABLET ORAL 2 TIMES DAILY
Qty: 20 TABLET | Refills: 0 | Status: SHIPPED | OUTPATIENT
Start: 2021-08-03 | End: 2022-02-23

## 2021-08-03 NOTE — PROGRESS NOTES
"Chief Complaint  Allergies (congestion in head)    Subjective   Presents today with congestion.  States she was born child to test positive RSV.  She has not had any fever        Marissa Bunch presents to [facility]  URI   This is a new problem. The current episode started in the past 7 days. The problem has been gradually worsening. There has been no fever. Associated symptoms include coughing, sinus pain and a sore throat. Pertinent negatives include no chest pain, congestion, diarrhea, dysuria, ear pain, nausea or wheezing.       Review of Systems   Constitutional: Negative for activity change, appetite change and chills.   HENT: Positive for postnasal drip, sinus pain and sore throat. Negative for congestion, ear pain and trouble swallowing.    Eyes: Negative for discharge, itching and visual disturbance.   Respiratory: Positive for cough. Negative for apnea and wheezing.    Cardiovascular: Negative for chest pain and leg swelling.   Gastrointestinal: Negative for abdominal distention, constipation, diarrhea and nausea.   Endocrine: Negative for cold intolerance, heat intolerance and polyuria.   Genitourinary: Negative for dysuria, frequency and urgency.   Musculoskeletal: Negative for arthralgias, back pain and myalgias.   Skin: Negative for color change, pallor and wound.   Neurological: Negative for dizziness, seizures, syncope, weakness and light-headedness.   Psychiatric/Behavioral: Negative for agitation, confusion and sleep disturbance. The patient is not nervous/anxious.          Objective   Vital Signs:   /80   Pulse 69   Temp 98.9 °F (37.2 °C) (Infrared)   Ht 170.2 cm (67\")   Wt 95.3 kg (210 lb)   SpO2 98%   BMI 32.89 kg/m²     Physical Exam  Vitals and nursing note reviewed.   Constitutional:       Appearance: She is well-developed.   HENT:      Head: Normocephalic and atraumatic.   Eyes:      General: Lids are normal.      Conjunctiva/sclera: " Conjunctivae normal.   Neck:      Thyroid: No thyroid mass or thyromegaly.      Trachea: Trachea normal. No tracheal tenderness.   Cardiovascular:      Rate and Rhythm: Normal rate.      Pulses: Normal pulses.      Heart sounds: Normal heart sounds.   Pulmonary:      Effort: Pulmonary effort is normal. No respiratory distress.      Breath sounds: Normal breath sounds. No wheezing.   Abdominal:      General: There is no distension.      Palpations: Abdomen is soft. There is no mass.   Musculoskeletal:         General: Normal range of motion.      Cervical back: Normal range of motion. No edema.   Lymphadenopathy:      Head:      Right side of head: No submental, submandibular or tonsillar adenopathy.      Left side of head: No submental, submandibular or tonsillar adenopathy.   Skin:     General: Skin is warm and dry.      Coloration: Skin is not pale.      Findings: No abrasion, erythema or lesion.   Neurological:      Mental Status: She is alert and oriented to person, place, and time.   Psychiatric:         Mood and Affect: Mood is not anxious. Affect is not inappropriate.         Speech: Speech normal.         Behavior: Behavior normal.         Thought Content: Thought content normal.         Judgment: Judgment normal. Judgment is not impulsive.        Result Review :                 Assessment and Plan    Diagnoses and all orders for this visit:    1. Cough (Primary)  -     methylPREDNISolone (MEDROL) 4 MG dose pack; Take as directed on package instructions.  Dispense: 21 each; Refill: 0  -     fluconazole (Diflucan) 150 MG tablet; Once a day for 3 days  Dispense: 3 tablet; Refill: 0  -     brompheniramine-pseudoephedrine-DM 30-2-10 MG/5ML syrup; Take 2.5 mL by mouth 4 (Four) Times a Day As Needed for Allergies.  Dispense: 118 mL; Refill: 0  -     cefuroxime (CEFTIN) 500 MG tablet; Take 1 tablet by mouth 2 (Two) Times a Day.  Dispense: 20 tablet; Refill: 0    2. Sinus congestion  -     methylPREDNISolone  (MEDROL) 4 MG dose pack; Take as directed on package instructions.  Dispense: 21 each; Refill: 0  -     fluconazole (Diflucan) 150 MG tablet; Once a day for 3 days  Dispense: 3 tablet; Refill: 0  -     brompheniramine-pseudoephedrine-DM 30-2-10 MG/5ML syrup; Take 2.5 mL by mouth 4 (Four) Times a Day As Needed for Allergies.  Dispense: 118 mL; Refill: 0  -     cefuroxime (CEFTIN) 500 MG tablet; Take 1 tablet by mouth 2 (Two) Times a Day.  Dispense: 20 tablet; Refill: 0    3. Yeast vaginitis      We will start with Medrol Dosepak  Cough syrup may take 4 times a day as needed  Ceftin 1 tablet by mouth 2 times a day comment instructed to complete Medrol Dosepak before starting antibiotic  May use Flonase once in the morning once in the evening  Continue to stay hydrated  Previously treated for a yeast infection, feels like it is getting better but still a little bit of irritation  Diflucan 150 mg    Please call if you have any issues or if symptoms worsen or persist      I spent 21 minutes caring for Marissa on this date of service. This time includes time spent by me in the following activities:preparing for the visit, performing a medically appropriate examination and/or evaluation , counseling and educating the patient/family/caregiver, ordering medications, tests, or procedures and documenting information in the medical record  Follow Up   Return if symptoms worsen or fail to improve, for Recheck, Next scheduled follow up.  Patient was given instructions and counseling regarding her condition or for health maintenance advice. Please see specific information pulled into the AVS if appropriate.           This document has been electronically signed by JOSE Brambila on August 3, 2021 16:04 CDT

## 2022-01-03 RX ORDER — ALBUTEROL SULFATE 90 UG/1
AEROSOL, METERED RESPIRATORY (INHALATION)
Qty: 6.7 G | Refills: 5 | Status: SHIPPED | OUTPATIENT
Start: 2022-01-03 | End: 2022-08-08 | Stop reason: SDUPTHER

## 2022-01-17 ENCOUNTER — OFFICE VISIT (OUTPATIENT)
Dept: FAMILY MEDICINE CLINIC | Facility: CLINIC | Age: 47
End: 2022-01-17

## 2022-01-17 VITALS
RESPIRATION RATE: 22 BRPM | HEART RATE: 80 BPM | OXYGEN SATURATION: 97 % | WEIGHT: 217.9 LBS | BODY MASS INDEX: 34.2 KG/M2 | DIASTOLIC BLOOD PRESSURE: 78 MMHG | SYSTOLIC BLOOD PRESSURE: 124 MMHG | HEIGHT: 67 IN

## 2022-01-17 DIAGNOSIS — J98.8 CONGESTION OF UPPER AIRWAY: ICD-10-CM

## 2022-01-17 DIAGNOSIS — J02.9 SORE THROAT: Primary | ICD-10-CM

## 2022-01-17 DIAGNOSIS — R05.9 COUGH: ICD-10-CM

## 2022-01-17 LAB
EXPIRATION DATE: NORMAL
INTERNAL CONTROL: NORMAL
Lab: NORMAL
S PYO AG THROAT QL: NEGATIVE
SARS-COV-2 N GENE RESP QL NAA+PROBE: NOT DETECTED

## 2022-01-17 PROCEDURE — 96372 THER/PROPH/DIAG INJ SC/IM: CPT | Performed by: NURSE PRACTITIONER

## 2022-01-17 PROCEDURE — 87635 SARS-COV-2 COVID-19 AMP PRB: CPT | Performed by: NURSE PRACTITIONER

## 2022-01-17 PROCEDURE — 87880 STREP A ASSAY W/OPTIC: CPT | Performed by: NURSE PRACTITIONER

## 2022-01-17 PROCEDURE — 99214 OFFICE O/P EST MOD 30 MIN: CPT | Performed by: NURSE PRACTITIONER

## 2022-01-17 RX ORDER — DEXTROMETHORPHAN HYDROBROMIDE AND PROMETHAZINE HYDROCHLORIDE 15; 6.25 MG/5ML; MG/5ML
5 SYRUP ORAL 4 TIMES DAILY PRN
Qty: 118 ML | Refills: 0 | Status: SHIPPED | OUTPATIENT
Start: 2022-01-17 | End: 2022-02-23

## 2022-01-17 RX ORDER — TRIAMCINOLONE ACETONIDE 40 MG/ML
80 INJECTION, SUSPENSION INTRA-ARTICULAR; INTRAMUSCULAR ONCE
Status: COMPLETED | OUTPATIENT
Start: 2022-01-17 | End: 2022-01-17

## 2022-01-17 RX ADMIN — TRIAMCINOLONE ACETONIDE 80 MG: 40 INJECTION, SUSPENSION INTRA-ARTICULAR; INTRAMUSCULAR at 10:44

## 2022-01-17 NOTE — PROGRESS NOTES
Chief Complaint  Sore Throat    Subjective          Marissa Janelle Bunch presents to [facility] [place] with concerns of congestion that started Thursday, she lost voice yesterday and now her throat is hurting. Has been taking lozenges OTC with little to no relief.         Sore Throat   This is a new problem. The current episode started in the past 7 days. The problem has been gradually worsening. There has been no fever. Associated symptoms include congestion, coughing and a hoarse voice. Pertinent negatives include no diarrhea, ear pain or trouble swallowing. She has tried nothing for the symptoms. The treatment provided no relief.   Cough  This is a new problem. The current episode started in the past 7 days. The problem has been gradually worsening. The cough is productive of sputum. Associated symptoms include postnasal drip and a sore throat. Pertinent negatives include no chest pain, chills, ear pain, myalgias or wheezing. The symptoms are aggravated by cold air. The treatment provided no relief.     Outpatient Medications Prior to Visit   Medication Sig Dispense Refill   • albuterol sulfate  (90 Base) MCG/ACT inhaler INHALE 2 PUFFS BY MOUTH FOUR TIMES DAILY AS NEEDED 6.7 g 5   • atorvastatin (Lipitor) 10 MG tablet Take 1 tablet by mouth Every Night. 90 tablet 3   • brompheniramine-pseudoephedrine-DM 30-2-10 MG/5ML syrup Take 2.5 mL by mouth 4 (Four) Times a Day As Needed for Allergies. 118 mL 0   • cefuroxime (CEFTIN) 500 MG tablet Take 1 tablet by mouth 2 (Two) Times a Day. 20 tablet 0   • clindamycin (Cleocin) 100 MG vaginal suppository Insert 1 suppository into the vagina Every Night. 3 suppository 5   • diclofenac (VOLTAREN) 1 % gel gel Apply 4 g topically to the appropriate area as directed 3 (Three) Times a Day. 100 g 11   • eletriptan (RELPAX) 40 MG tablet Take 1 tablet by mouth Daily As Needed for Headache. may repeat in 2 hours if necessary 12  "tablet 5   • fluconazole (Diflucan) 150 MG tablet Once a day for 3 days 3 tablet 0   • metoprolol succinate XL (TOPROL XL) 25 MG 24 hr tablet Take 1 tablet by mouth Daily. 30 tablet 11   • mometasone-formoterol (DULERA 100) 100-5 MCG/ACT inhaler Inhale 2 puffs 2 (Two) Times a Day. 13 g 11   • PARoxetine (PAXIL) 10 MG tablet TAKE 1 TABLET BY MOUTH EVERY MORNING 30 tablet 11   • verapamil SR (CALAN-SR) 240 MG CR tablet TAKE 1 TABLET BY MOUTH TWICE DAILY 60 tablet 11   • zolpidem (Ambien) 10 MG tablet Take 1 tablet by mouth At Night As Needed for Sleep. Needs appointment for refills 30 tablet 2   • methylPREDNISolone (MEDROL) 4 MG dose pack Take as directed on package instructions. 21 each 0     No facility-administered medications prior to visit.       Review of Systems   Constitutional: Negative for activity change, appetite change and chills.   HENT: Positive for congestion, hoarse voice, postnasal drip, sore throat and voice change. Negative for ear pain and trouble swallowing.    Eyes: Negative for discharge, itching and visual disturbance.   Respiratory: Positive for cough. Negative for apnea and wheezing.    Cardiovascular: Negative for chest pain and leg swelling.   Gastrointestinal: Negative for abdominal distention, constipation, diarrhea and nausea.   Endocrine: Negative for cold intolerance, heat intolerance and polyuria.   Genitourinary: Negative for dysuria, frequency and urgency.   Musculoskeletal: Negative for arthralgias, back pain and myalgias.   Skin: Negative for color change, pallor and wound.   Neurological: Negative for dizziness, seizures, syncope, weakness and light-headedness.   Psychiatric/Behavioral: Negative for agitation, confusion and sleep disturbance. The patient is not nervous/anxious.          Objective   Vital Signs:   Visit Vitals  /78 (BP Location: Right arm, Patient Position: Sitting, Cuff Size: Large Adult)   Pulse 80   Resp 22   Ht 170.2 cm (67\")   Wt 98.8 kg (217 lb 14.4 " oz)   SpO2 97%   BMI 34.13 kg/m²     Physical Exam  Vitals and nursing note reviewed.   Constitutional:       Appearance: She is well-developed.   HENT:      Head: Normocephalic and atraumatic.      Mouth/Throat:      Pharynx: Posterior oropharyngeal erythema present.   Eyes:      General: Lids are normal.      Conjunctiva/sclera: Conjunctivae normal.   Neck:      Thyroid: No thyroid mass or thyromegaly.      Trachea: Trachea normal. No tracheal tenderness.   Cardiovascular:      Rate and Rhythm: Normal rate.      Pulses: Normal pulses.      Heart sounds: Normal heart sounds.   Pulmonary:      Effort: Pulmonary effort is normal. No respiratory distress.      Breath sounds: Normal breath sounds. No wheezing.   Abdominal:      General: There is no distension.      Palpations: Abdomen is soft. There is no mass.   Musculoskeletal:         General: Normal range of motion.      Cervical back: Normal range of motion. No edema.   Lymphadenopathy:      Head:      Right side of head: No submental, submandibular or tonsillar adenopathy.      Left side of head: No submental, submandibular or tonsillar adenopathy.   Skin:     General: Skin is warm and dry.      Coloration: Skin is not pale.      Findings: No abrasion, erythema or lesion.   Neurological:      Mental Status: She is alert and oriented to person, place, and time.   Psychiatric:         Mood and Affect: Mood is not anxious. Affect is not inappropriate.         Speech: Speech normal.         Behavior: Behavior normal.         Thought Content: Thought content normal.         Judgment: Judgment normal. Judgment is not impulsive.        Result Review :                 Assessment and Plan    Diagnoses and all orders for this visit:    1. Sore throat (Primary)  -     COVID-19, [facility] IN-HOUSE, NP SWAB IN TRANSPORT MEDIA 8-10 HR TAT - Swab, Oropharynx; Future  -     POCT rapid strep A  -     COVID-19, [facility] IN-HOUSE, NP SWAB IN TRANSPORT MEDIA 8-10 HR TAT - Swab,  Oropharynx  -     triamcinolone acetonide (KENALOG-40) injection 80 mg  -     promethazine-dextromethorphan (PROMETHAZINE-DM) 6.25-15 MG/5ML syrup; Take 5 mL by mouth 4 (Four) Times a Day As Needed for Cough.  Dispense: 118 mL; Refill: 0    2. Congestion of upper airway  -     COVID-19, [facility] IN-HOUSE, NP SWAB IN TRANSPORT MEDIA 8-10 HR TAT - Swab, Oropharynx; Future  -     COVID-19, [facility] IN-HOUSE, NP SWAB IN TRANSPORT MEDIA 8-10 HR TAT - Swab, Oropharynx  -     triamcinolone acetonide (KENALOG-40) injection 80 mg  -     promethazine-dextromethorphan (PROMETHAZINE-DM) 6.25-15 MG/5ML syrup; Take 5 mL by mouth 4 (Four) Times a Day As Needed for Cough.  Dispense: 118 mL; Refill: 0    3. Cough  -     triamcinolone acetonide (KENALOG-40) injection 80 mg  -     promethazine-dextromethorphan (PROMETHAZINE-DM) 6.25-15 MG/5ML syrup; Take 5 mL by mouth 4 (Four) Times a Day As Needed for Cough.  Dispense: 118 mL; Refill: 0        Kenalog today   Start cough syrup    If no improvement in symptoms please call the office    POC strep negative       I spent 30 minutes caring for Marissa on this date of service. This time includes time spent by me in the following activities:preparing for the visit, performing a medically appropriate examination and/or evaluation , counseling and educating the patient/family/caregiver, ordering medications, tests, or procedures, documenting information in the medical record and independently interpreting results and communicating that information with the patient/family/caregiver  Follow Up   Return if symptoms worsen or fail to improve, for Next scheduled follow up.  Patient was given instructions and counseling regarding her condition or for health maintenance advice. Please see specific information pulled into the AVS if appropriate.           This document has been electronically signed by JOSE Brambila on January 17, 2022 11:12 CST

## 2022-02-23 ENCOUNTER — OFFICE VISIT (OUTPATIENT)
Dept: FAMILY MEDICINE CLINIC | Facility: CLINIC | Age: 47
End: 2022-02-23

## 2022-02-23 VITALS
HEART RATE: 79 BPM | HEIGHT: 67 IN | OXYGEN SATURATION: 98 % | WEIGHT: 214 LBS | SYSTOLIC BLOOD PRESSURE: 120 MMHG | DIASTOLIC BLOOD PRESSURE: 86 MMHG | BODY MASS INDEX: 33.59 KG/M2

## 2022-02-23 DIAGNOSIS — M54.50 ACUTE BILATERAL LOW BACK PAIN WITHOUT SCIATICA: Primary | ICD-10-CM

## 2022-02-23 DIAGNOSIS — Z12.11 ENCOUNTER FOR SCREENING COLONOSCOPY: ICD-10-CM

## 2022-02-23 DIAGNOSIS — F51.01 PRIMARY INSOMNIA: ICD-10-CM

## 2022-02-23 PROCEDURE — 99214 OFFICE O/P EST MOD 30 MIN: CPT | Performed by: NURSE PRACTITIONER

## 2022-02-23 RX ORDER — ZOLPIDEM TARTRATE 10 MG/1
10 TABLET ORAL NIGHTLY PRN
Qty: 30 TABLET | Refills: 2 | Status: SHIPPED | OUTPATIENT
Start: 2022-02-23 | End: 2022-08-08 | Stop reason: SDUPTHER

## 2022-02-23 RX ORDER — BACLOFEN 10 MG/1
10 TABLET ORAL 3 TIMES DAILY
Qty: 60 TABLET | Refills: 11 | Status: SHIPPED | OUTPATIENT
Start: 2022-02-23

## 2022-02-23 RX ORDER — METHYLPREDNISOLONE 4 MG/1
TABLET ORAL
Qty: 21 TABLET | Refills: 0 | Status: SHIPPED | OUTPATIENT
Start: 2022-02-23 | End: 2022-03-15

## 2022-02-23 NOTE — PROGRESS NOTES
Chief Complaint   Patient presents with   • Hip Pain     x 2 weeks   • Back Pain     Subjective   Marissa Bunch is a 46 y.o. female.           Back Pain  This is a recurrent problem. The current episode started more than 1 month ago. The problem occurs intermittently. The pain is present in the lumbar spine. The pain is moderate. The pain is the same all the time. Stiffness is present all day. Associated symptoms include paresthesias. Pertinent negatives include no abdominal pain, bladder incontinence, bowel incontinence, chest pain, dysuria, fever, headaches, leg pain, numbness, paresis, pelvic pain, perianal numbness, tingling, weakness or weight loss. She has tried analgesics and ice for the symptoms. The treatment provided no relief.   Insomnia  This is a recurrent problem. The current episode started more than 1 month ago. The problem occurs daily. The problem has been resolved. Associated symptoms include arthralgias, joint swelling, myalgias and neck pain. Pertinent negatives include no abdominal pain, chest pain, fever, headaches, numbness, urinary symptoms, visual change, vomiting or weakness. Treatments tried: ambien prn  The treatment provided significant relief.         The following portions of the patient's history were reviewed and updated as appropriate: allergies, current medications, past social history and problem list.    Review of Systems   Constitutional: Negative.  Negative for fever and weight loss.   HENT: Negative.    Eyes: Negative.    Respiratory: Negative.    Cardiovascular: Negative.  Negative for chest pain.   Gastrointestinal: Negative for abdominal pain, bowel incontinence and vomiting.   Endocrine: Negative.  Negative for cold intolerance, heat intolerance, polydipsia, polyphagia and polyuria.   Genitourinary: Negative.  Negative for bladder incontinence, dysuria and pelvic pain.   Musculoskeletal: Positive for arthralgias, back pain, gait problem, joint swelling, myalgias  "and neck pain. Negative for neck stiffness.   Allergic/Immunologic: Negative.    Neurological: Positive for paresthesias. Negative for tingling, weakness, numbness and headaches.   Hematological: Negative.    Psychiatric/Behavioral: Positive for sleep disturbance. Negative for hallucinations. The patient has insomnia. The patient is not nervous/anxious.         Uses ambien prn        Objective   /86   Pulse 79   Ht 170.2 cm (67\")   Wt 97.1 kg (214 lb)   SpO2 98%   BMI 33.52 kg/m²   Physical Exam  Vitals and nursing note reviewed.   Constitutional:       Appearance: She is well-developed.   HENT:      Head: Normocephalic and atraumatic.      Right Ear: Tympanic membrane normal. There is no impacted cerumen.      Left Ear: Tympanic membrane normal. There is no impacted cerumen.      Nose: No congestion or rhinorrhea.   Eyes:      General: Lids are normal.      Conjunctiva/sclera: Conjunctivae normal.   Neck:      Thyroid: No thyroid mass or thyromegaly.      Trachea: Trachea normal. No tracheal tenderness.   Cardiovascular:      Rate and Rhythm: Normal rate.      Pulses: Normal pulses.      Heart sounds: Normal heart sounds. No murmur heard.  No friction rub. No gallop.    Pulmonary:      Effort: Pulmonary effort is normal. No respiratory distress.      Breath sounds: Normal breath sounds. No wheezing.   Abdominal:      General: There is no distension.      Palpations: Abdomen is soft. There is no mass.   Musculoskeletal:         General: Tenderness present. No swelling, deformity or signs of injury. Normal range of motion.      Cervical back: Normal range of motion. No edema.      Thoracic back: Spasms and tenderness present.        Back:       Right lower leg: No edema.      Left lower leg: No edema.   Lymphadenopathy:      Head:      Right side of head: No submental, submandibular or tonsillar adenopathy.      Left side of head: No submental, submandibular or tonsillar adenopathy.   Skin:     General: " Skin is warm and dry.      Coloration: Skin is not jaundiced or pale.      Findings: No abrasion, bruising, erythema, lesion or rash.   Neurological:      General: No focal deficit present.      Mental Status: She is alert and oriented to person, place, and time.      Cranial Nerves: No cranial nerve deficit.      Motor: No weakness.   Psychiatric:         Mood and Affect: Mood is not anxious. Affect is not inappropriate.         Speech: Speech normal.         Behavior: Behavior normal.         Thought Content: Thought content normal.         Judgment: Judgment normal. Judgment is not impulsive.              Assessment/Plan     Problems Addressed this Visit        Sleep    Insomnia    Relevant Medications    zolpidem (Ambien) 10 MG tablet      Other Visit Diagnoses     Acute bilateral low back pain without sciatica    -  Primary    Relevant Medications    zolpidem (Ambien) 10 MG tablet    baclofen (LIORESAL) 10 MG tablet    methylPREDNISolone (MEDROL) 4 MG dose pack    Other Relevant Orders    XR Spine Lumbar AP & Lateral (Completed)    XR hips bilateral w or wo pelvis 2 view (Completed)    Ambulatory Referral to Gastroenterology    Encounter for screening colonoscopy        Relevant Orders    Ambulatory Referral to Gastroenterology      Diagnoses       Codes Comments    Acute bilateral low back pain without sciatica    -  Primary ICD-10-CM: M54.50  ICD-9-CM: 724.2, 338.19     Primary insomnia     ICD-10-CM: F51.01  ICD-9-CM: 307.42     Encounter for screening colonoscopy     ICD-10-CM: Z12.11  ICD-9-CM: V76.51            New Medications Ordered This Visit   Medications   • zolpidem (Ambien) 10 MG tablet     Sig: Take 1 tablet by mouth At Night As Needed for Sleep. Needs appointment for refills     Dispense:  30 tablet     Refill:  2   • baclofen (LIORESAL) 10 MG tablet     Sig: Take 1 tablet by mouth 3 (Three) Times a Day.     Dispense:  60 tablet     Refill:  11   • methylPREDNISolone (MEDROL) 4 MG dose pack      Sig: Take as directed on package instructions.     Dispense:  21 tablet     Refill:  0     Current Outpatient Medications on File Prior to Visit   Medication Sig Dispense Refill   • albuterol sulfate  (90 Base) MCG/ACT inhaler INHALE 2 PUFFS BY MOUTH FOUR TIMES DAILY AS NEEDED 6.7 g 5   • atorvastatin (Lipitor) 10 MG tablet Take 1 tablet by mouth Every Night. 90 tablet 3   • clindamycin (Cleocin) 100 MG vaginal suppository Insert 1 suppository into the vagina Every Night. 3 suppository 5   • diclofenac (VOLTAREN) 1 % gel gel Apply 4 g topically to the appropriate area as directed 3 (Three) Times a Day. 100 g 11   • eletriptan (RELPAX) 40 MG tablet Take 1 tablet by mouth Daily As Needed for Headache. may repeat in 2 hours if necessary 12 tablet 5   • fluconazole (Diflucan) 150 MG tablet Once a day for 3 days 3 tablet 0   • metoprolol succinate XL (TOPROL XL) 25 MG 24 hr tablet Take 1 tablet by mouth Daily. 30 tablet 11   • mometasone-formoterol (DULERA 100) 100-5 MCG/ACT inhaler Inhale 2 puffs 2 (Two) Times a Day. 13 g 11   • PARoxetine (PAXIL) 10 MG tablet TAKE 1 TABLET BY MOUTH EVERY MORNING 30 tablet 11   • verapamil SR (CALAN-SR) 240 MG CR tablet TAKE 1 TABLET BY MOUTH TWICE DAILY 60 tablet 11   • [DISCONTINUED] brompheniramine-pseudoephedrine-DM 30-2-10 MG/5ML syrup Take 2.5 mL by mouth 4 (Four) Times a Day As Needed for Allergies. 118 mL 0   • [DISCONTINUED] cefuroxime (CEFTIN) 500 MG tablet Take 1 tablet by mouth 2 (Two) Times a Day. 20 tablet 0   • [DISCONTINUED] promethazine-dextromethorphan (PROMETHAZINE-DM) 6.25-15 MG/5ML syrup Take 5 mL by mouth 4 (Four) Times a Day As Needed for Cough. 118 mL 0   • [DISCONTINUED] zolpidem (Ambien) 10 MG tablet Take 1 tablet by mouth At Night As Needed for Sleep. Needs appointment for refills 30 tablet 2     No current facility-administered medications on file prior to visit.       20 minutes  Follow Up   Return in about 3 months (around 5/23/2022).         COMPARISON: No comparison     HISTORY: lumbar pain, M54.50 Low back pain, unspecified.     FINDINGS:  Number of lumbar type vertebral bodies: 5  Alignment: Normal  Lumbar lordosis: Within normal limits  Vertebral bodies: Normal in height  Fractures: No radiographic evidence of acute fracture  Disc spaces: Mild narrowing at L4/5 and L5/S1.  Facet joints: Degenerative changes of the lower lumbar region.  Osteophytes: Small anterior osteophytes  There is an elongated thin calcification in the left mid  abdominal region measuring 0.5 cm in length which could be in the  left lower kidney or left proximal ureter.     IMPRESSION:  CONCLUSION:    Multilevel degenerative changes.     Electronically signed by:  Iker Darnell MD  2/23/2022 10:27 AM  CST Workstation  meds as directed, follow up if worsen     Study Result    Narrative & Impression   Procedure: XR HIP 2 OR MORE VIEWS BILATERAL WITH ANTEROPOSTERIOR  PELVIS.     History: hip pain, M54.50 Low back pain, unspecified.     Comparison: None     Findings:  One frontal x-ray of the pelvis and bilateral frog leg x-rays of  the hips. Mild sclerosis and osteophyte formation involving the  superolateral aspects of both acetabula, slightly worse on the  right.  There is no radiographic evidence of acute fracture, dislocation  or suspicious bony abnormality.  Calcifications noted in the pelvis are likely phleboliths.     IMPRESSION:  Impression: Mild degenerative changes of both hips. No  radiographic evidence of acute fracture.     Electronically signed by:  kIer Darnell MD  2/23/2022 10:23 AM  CST Workstation: [id number]     Xray , meds as directed, follow up as directed, see back sooner if needed, patient agrees

## 2022-03-15 ENCOUNTER — OFFICE VISIT (OUTPATIENT)
Dept: GASTROENTEROLOGY | Facility: CLINIC | Age: 47
End: 2022-03-15

## 2022-03-15 VITALS
HEART RATE: 82 BPM | SYSTOLIC BLOOD PRESSURE: 110 MMHG | WEIGHT: 208.2 LBS | DIASTOLIC BLOOD PRESSURE: 76 MMHG | HEIGHT: 67 IN | BODY MASS INDEX: 32.68 KG/M2

## 2022-03-15 DIAGNOSIS — Z12.11 ENCOUNTER FOR SCREENING FOR MALIGNANT NEOPLASM OF COLON: Primary | ICD-10-CM

## 2022-03-15 PROCEDURE — S0285 CNSLT BEFORE SCREEN COLONOSC: HCPCS | Performed by: NURSE PRACTITIONER

## 2022-03-15 RX ORDER — SODIUM PICOSULFATE, MAGNESIUM OXIDE, AND ANHYDROUS CITRIC ACID 10; 3.5; 12 MG/160ML; G/160ML; G/160ML
160 LIQUID ORAL ONCE
Qty: 320 ML | Refills: 0 | Status: SHIPPED | OUTPATIENT
Start: 2022-03-15 | End: 2022-03-15

## 2022-03-15 RX ORDER — DEXTROSE AND SODIUM CHLORIDE 5; .45 G/100ML; G/100ML
30 INJECTION, SOLUTION INTRAVENOUS CONTINUOUS PRN
Status: CANCELLED | OUTPATIENT
Start: 2022-04-06

## 2022-03-15 NOTE — PROGRESS NOTES
Chief Complaint   Patient presents with   • Colon Cancer Screening       Subjective    Marissa Bunch is a 46 y.o. female. she is here today for follow-up.    History of Present Illness  46-year-old female presents to discuss screening colonoscopy.  Denies any abdominal pain change in bowel movements.  Denies any melena or hematochezia.  Weight is stable denies any known family history of colorectal cancer or polyps.  Plan: Schedule patient for initial screening colonoscopy.       The following portions of the patient's history were reviewed and updated as appropriate:   Past Medical History:   Diagnosis Date   • Acquired deformity of foot     equinus   • Acute pharyngitis    • Acute vaginitis    • Asthma    • Bacterial vaginosis    • Candidiasis of vagina    • Dysuria    • Encounter for gynecological examination (general) (routine) without abnormal findings    • Encounter for screening for osteoporosis    • Exanthematous disorder    • Folliculitis    • Foot pain    • Headache    • Heel pain     plantar   • Herpes zoster    • History of bone density study 02/26/2016   • History of mammogram 02/27/2016   • Hypertensive disorder    • Insomnia    • Insomnia, unspecified    • Itchy skin    • Malaise and fatigue    • Neck muscle spasm     Muscle spasm of cervical muscle of neck    • Neck pain    • Overweight    • Pain in elbow     Tendinitis, history of      • Pain of cervical spine    • Plantar fasciitis    • Sinusitis    • Snoring    • Tendinitis     AND/OR tenosynovitis of wrist AND/OR hand - improving     • Vaginal discharge    • Vulvovaginitis      Past Surgical History:   Procedure Laterality Date   • INJECTION OF MEDICATION  12/12/2014    Kenalog (3)      • INJECTION OF MEDICATION  01/13/2011    Vistaril (1)      • REDUCTION MAMMAPLASTY     • TUBAL ABDOMINAL LIGATION  03/24/2000    Examination under anesthesia and laparoscopic tubal sterilization     Family History   Problem Relation Age of Onset   • Diabetes  Other    • Hypertension Other      OB History        2    Para   2    Term   2            AB        Living           SAB        IAB        Ectopic        Molar        Multiple        Live Births                  Prior to Admission medications    Medication Sig Start Date End Date Taking? Authorizing Provider   albuterol sulfate  (90 Base) MCG/ACT inhaler INHALE 2 PUFFS BY MOUTH FOUR TIMES DAILY AS NEEDED 1/3/22   Shruthi Solorzano APRN   atorvastatin (Lipitor) 10 MG tablet Take 1 tablet by mouth Every Night. 21   Shruthi Solorzano APRN   baclofen (LIORESAL) 10 MG tablet Take 1 tablet by mouth 3 (Three) Times a Day. 22   Shruthi Solorzano APRN   Clenpiq 10-3.5-12 MG-GM -GM/160ML solution Take 160 mL by mouth 1 (One) Time for 1 dose. 3/15/22 3/15/22  Harriet Ladd APRN   clindamycin (Cleocin) 100 MG vaginal suppository Insert 1 suppository into the vagina Every Night. 21   Shruthi Solorzano APRN   diclofenac (VOLTAREN) 1 % gel gel Apply 4 g topically to the appropriate area as directed 3 (Three) Times a Day. 19   Shruthi Solorzano APRN   eletriptan (RELPAX) 40 MG tablet Take 1 tablet by mouth Daily As Needed for Headache. may repeat in 2 hours if necessary 21   Shruthi Solorzano APRN   metoprolol succinate XL (TOPROL XL) 25 MG 24 hr tablet Take 1 tablet by mouth Daily. 19   Shruthi Solorzano APRN   mometasone-formoterol (DULERA 100) 100-5 MCG/ACT inhaler Inhale 2 puffs 2 (Two) Times a Day. 21   Shruthi Solorzano APRN   PARoxetine (PAXIL) 10 MG tablet TAKE 1 TABLET BY MOUTH EVERY MORNING 21   Shruthi Solorzano APRN   verapamil SR (CALAN-SR) 240 MG CR tablet TAKE 1 TABLET BY MOUTH TWICE DAILY 21   Shruthi Solorzano APRN   zolpidem (Ambien) 10 MG tablet Take 1 tablet by mouth At Night As Needed for Sleep. Needs appointment for refills 22   Shruthi Solorzano APRN   fluconazole (Diflucan) 150 MG  "tablet Once a day for 3 days 8/3/21 3/15/22  Kyra Guevara APRN   methylPREDNISolone (MEDROL) 4 MG dose pack Take as directed on package instructions. 2/23/22 3/15/22  Shruthi Solorzano APRN     No Known Allergies  Social History     Socioeconomic History   • Marital status:    Tobacco Use   • Smoking status: Never Smoker   • Smokeless tobacco: Never Used       Review of Systems  Review of Systems   Constitutional: Negative for activity change, appetite change, chills, diaphoresis, fatigue, fever and unexpected weight change.   HENT: Negative for sore throat and trouble swallowing.    Respiratory: Negative for shortness of breath.    Gastrointestinal: Negative for abdominal distention, abdominal pain, anal bleeding, blood in stool, constipation, diarrhea, nausea, rectal pain and vomiting.   Musculoskeletal: Negative for arthralgias.   Skin: Negative for pallor.   Neurological: Negative for light-headedness.        /76 (BP Location: Left arm)   Pulse 82   Ht 170.2 cm (67\")   Wt 94.4 kg (208 lb 3.2 oz)   BMI 32.61 kg/m²     Objective    Physical Exam  Constitutional:       General: She is not in acute distress.     Appearance: Normal appearance. She is normal weight. She is not ill-appearing.   HENT:      Head: Normocephalic and atraumatic.   Pulmonary:      Effort: Pulmonary effort is normal.   Abdominal:      General: Abdomen is flat. Bowel sounds are normal. There is no distension.      Palpations: Abdomen is soft. There is no mass.      Tenderness: There is no abdominal tenderness.   Neurological:      Mental Status: She is alert.       Office Visit on 01/17/2022   Component Date Value Ref Range Status   • Rapid Strep A Screen 01/17/2022 Negative  Negative, VALID, INVALID, Not Performed Final   • Internal Control 01/17/2022 Passed  Passed Final   • Lot Number 01/17/2022 [id number]   Final   • Expiration Date 01/17/2022 3.31.2022   Final   • COVID19 01/17/2022 Not Detected  Not Detected - " Ref. Range Final     Assessment/Plan      1. Encounter for screening for malignant neoplasm of colon    .       Orders placed during this encounter include:  Orders Placed This Encounter   Procedures   • Follow Anesthesia Guidelines / Standing Orders     Standing Status:   Future   • Obtain Informed Consent     Standing Status:   Future     Order Specific Question:   Informed Consent Given For     Answer:   COLONOSCOPY       COLONOSCOPY (N/A)    Review and/or summary of lab tests, radiology, procedures, medications. Review and summary of old records and obtaining of history. The risks and benefits of my recommendations, as well as other treatment options were discussed with the patient today. Questions were answered.    New Medications Ordered This Visit   Medications   • Clenpiq 10-3.5-12 MG-GM -GM/160ML solution     Sig: Take 160 mL by mouth 1 (One) Time for 1 dose.     Dispense:  320 mL     Refill:  0       Follow-up: Return in about 4 weeks (around 4/12/2022).          This document has been electronically signed by JOSE Luong on March 15, 2022 15:36 CDT           I spent 16 minutes caring for Marissa on this date of service. This time includes time spent by me in the following activities:preparing for the visit, reviewing tests, obtaining and/or reviewing a separately obtained history, performing a medically appropriate examination and/or evaluation , counseling and educating the patient/family/caregiver, ordering medications, tests, or procedures, referring and communicating with other health care professionals , documenting information in the medical record and care coordination    Results for orders placed or performed in visit on 01/17/22   COVID-19, [facility] IN-HOUSE, NP SWAB IN TRANSPORT MEDIA 8-10 HR TAT - Swab, Oropharynx    Specimen: Oropharynx; Swab   Result Value Ref Range    COVID19 Not Detected Not Detected - Ref. Range   POCT rapid strep A    Specimen: Swab   Result Value Ref Range    Rapid  Strep A Screen Negative Negative, VALID, INVALID, Not Performed    Internal Control Passed Passed    Lot Number [id number]     Expiration Date 3.31.2022    Results for orders placed or performed in visit on 07/12/21   CBC Auto Differential    Specimen: Blood   Result Value Ref Range    WBC 7.42 3.40 - 10.80 10*3/mm3    RBC 5.14 3.77 - 5.28 10*6/mm3    Hemoglobin 15.9 12.0 - 15.9 g/dL    Hematocrit 46.6 34.0 - 46.6 %    MCV 90.7 79.0 - 97.0 fL    MCH 30.9 26.6 - 33.0 pg    MCHC 34.1 31.5 - 35.7 g/dL    RDW 13.2 12.3 - 15.4 %    RDW-SD 44.3 37.0 - 54.0 fl    MPV 11.1 6.0 - 12.0 fL    Platelets 267 140 - 450 10*3/mm3    Neutrophil % 59.6 42.7 - 76.0 %    Lymphocyte % 22.8 19.6 - 45.3 %    Monocyte % 7.4 5.0 - 12.0 %    Eosinophil % 8.8 (H) 0.3 - 6.2 %    Basophil % 1.1 0.0 - 1.5 %    Immature Grans % 0.3 0.0 - 0.5 %    Neutrophils, Absolute 4.43 1.70 - 7.00 10*3/mm3    Lymphocytes, Absolute 1.69 0.70 - 3.10 10*3/mm3    Monocytes, Absolute 0.55 0.10 - 0.90 10*3/mm3    Eosinophils, Absolute 0.65 (H) 0.00 - 0.40 10*3/mm3    Basophils, Absolute 0.08 0.00 - 0.20 10*3/mm3    Immature Grans, Absolute 0.02 0.00 - 0.05 10*3/mm3    nRBC 0.0 0.0 - 0.2 /100 WBC   Hepatitis C Antibody    Specimen: Blood   Result Value Ref Range    Hepatitis C Ab Non-Reactive Non-Reactive   Vitamin D 25 Hydroxy    Specimen: Blood   Result Value Ref Range    25 Hydroxy, Vitamin D 34.5 30.0 - 100.0 ng/ml   TSH    Specimen: Blood   Result Value Ref Range    TSH 0.873 0.270 - 4.200 uIU/mL   Iron    Specimen: Blood   Result Value Ref Range    Iron 100 37 - 145 mcg/dL   Hemoglobin A1c    Specimen: Blood   Result Value Ref Range    Hemoglobin A1C 5.60 4.80 - 5.60 %   Vitamin B12    Specimen: Blood   Result Value Ref Range    Vitamin B-12 646 211 - 946 pg/mL   Lipid Panel    Specimen: Blood   Result Value Ref Range    Total Cholesterol 242 (H) 0 - 200 mg/dL    Triglycerides 163 (H) 0 - 150 mg/dL    HDL Cholesterol 56 40 - 60 mg/dL    LDL Cholesterol  157  (H) 0 - 100 mg/dL    VLDL Cholesterol 29 5 - 40 mg/dL    LDL/HDL Ratio 2.74    Comprehensive Metabolic Panel    Specimen: Blood   Result Value Ref Range    Glucose 96 65 - 99 mg/dL    BUN 14 6 - 20 mg/dL    Creatinine 1.00 0.57 - 1.00 mg/dL    Sodium 143 136 - 145 mmol/L    Potassium 5.0 3.5 - 5.2 mmol/L    Chloride 100 98 - 107 mmol/L    CO2 28.9 22.0 - 29.0 mmol/L    Calcium 9.9 8.6 - 10.5 mg/dL    Total Protein 7.6 6.0 - 8.5 g/dL    Albumin 4.50 3.50 - 5.20 g/dL    ALT (SGPT) 24 1 - 33 U/L    AST (SGOT) 24 1 - 32 U/L    Alkaline Phosphatase 99 39 - 117 U/L    Total Bilirubin 0.4 0.0 - 1.2 mg/dL    eGFR  African Amer 72 >60 mL/min/1.73    Globulin 3.1 gm/dL    A/G Ratio 1.5 g/dL    BUN/Creatinine Ratio 14.0 7.0 - 25.0    Anion Gap 14.1 5.0 - 15.0 mmol/L   Liquid-based Pap Smear, Screening    Specimen: Cervix; ThinPrep Vial   Result Value Ref Range    Case Report       Gynecologic Cytology Report                       Case: [id number]                                  Authorizing Provider:  Shruthi Solorzano,     Collected:           07/12/2021 11:10 AM                                 APRN                                                                         Ordering Location:     [facility]     Received:            07/13/2021 07:23 AM                                 [facility]                                                           First Screen:          Melanie Lowe, DO                                                        Specimen:    Sendout to [facility], Cervix                                                                     Interpretation See attached report    Results for orders placed or performed during the hospital encounter of 12/30/20   COVID-19,[facility] ROUTINE, NP/OP SWAB IN TRANSPORT MEDIA OR ESWAB 72 HR TAT - Swab, Anterior nasal    Specimen: Anterior nasal; Swab   Result Value Ref Range    SARS-CoV-2, DARYL Detected (A) Not Detected   Results for orders placed or  performed in visit on 07/06/20   CBC Auto Differential    Specimen: Blood   Result Value Ref Range    WBC 9.83 3.40 - 10.80 10*3/mm3    RBC 4.59 3.77 - 5.28 10*6/mm3    Hemoglobin 14.4 12.0 - 15.9 g/dL    Hematocrit 41.6 34.0 - 46.6 %    MCV 90.6 79.0 - 97.0 fL    MCH 31.4 26.6 - 33.0 pg    MCHC 34.6 31.5 - 35.7 g/dL    RDW 13.4 12.3 - 15.4 %    RDW-SD 43.9 37.0 - 54.0 fl    MPV 11.4 6.0 - 12.0 fL    Platelets 257 140 - 450 10*3/mm3    Neutrophil % 67.6 42.7 - 76.0 %    Lymphocyte % 20.4 19.6 - 45.3 %    Monocyte % 7.1 5.0 - 12.0 %    Eosinophil % 3.9 0.3 - 6.2 %    Basophil % 0.7 0.0 - 1.5 %    Immature Grans % 0.3 0.0 - 0.5 %    Neutrophils, Absolute 6.64 1.70 - 7.00 10*3/mm3    Lymphocytes, Absolute 2.01 0.70 - 3.10 10*3/mm3    Monocytes, Absolute 0.70 0.10 - 0.90 10*3/mm3    Eosinophils, Absolute 0.38 0.00 - 0.40 10*3/mm3    Basophils, Absolute 0.07 0.00 - 0.20 10*3/mm3    Immature Grans, Absolute 0.03 0.00 - 0.05 10*3/mm3    nRBC 0.0 0.0 - 0.2 /100 WBC   Vitamin D 25 Hydroxy    Specimen: Blood   Result Value Ref Range    25 Hydroxy, Vitamin D 33.7 30.0 - 100.0 ng/ml   TSH    Specimen: Blood   Result Value Ref Range    TSH 1.180 0.270 - 4.200 uIU/mL   Magnesium    Specimen: Blood   Result Value Ref Range    Magnesium 2.2 1.6 - 2.6 mg/dL   Iron    Specimen: Blood   Result Value Ref Range    Iron 70 37 - 145 mcg/dL   Hemoglobin A1c    Specimen: Blood   Result Value Ref Range    Hemoglobin A1C 5.48 4.80 - 5.60 %   Vitamin B12    Specimen: Blood   Result Value Ref Range    Vitamin B-12 575 211 - 946 pg/mL   Lipid Panel    Specimen: Blood   Result Value Ref Range    Total Cholesterol 182 0 - 200 mg/dL    Triglycerides 193 (H) 0 - 150 mg/dL    HDL Cholesterol 54 40 - 60 mg/dL    LDL Cholesterol  89 0 - 100 mg/dL    VLDL Cholesterol 38.6 5 - 40 mg/dL    LDL/HDL Ratio 1.66    Comprehensive Metabolic Panel    Specimen: Blood   Result Value Ref Range    Glucose 81 65 - 99 mg/dL    BUN 11 6 - 20 mg/dL    Creatinine 1.11  (H) 0.57 - 1.00 mg/dL    Sodium 139 136 - 145 mmol/L    Potassium 3.7 3.5 - 5.2 mmol/L    Chloride 104 98 - 107 mmol/L    CO2 25.8 22.0 - 29.0 mmol/L    Calcium 9.3 8.6 - 10.5 mg/dL    Total Protein 7.2 6.0 - 8.5 g/dL    Albumin 4.10 3.50 - 5.20 g/dL    ALT (SGPT) 16 1 - 33 U/L    AST (SGOT) 17 1 - 32 U/L    Alkaline Phosphatase 76 39 - 117 U/L    Total Bilirubin 0.2 0.0 - 1.2 mg/dL    eGFR  African Amer 64 >60 mL/min/1.73    Globulin 3.1 gm/dL    A/G Ratio 1.3 g/dL    BUN/Creatinine Ratio 9.9 7.0 - 25.0    Anion Gap 9.2 5.0 - 15.0 mmol/L     *Note: Due to a large number of results and/or encounters for the requested time period, some results have not been displayed. A complete set of results can be found in Results Review.

## 2022-03-22 RX ORDER — ELETRIPTAN HYDROBROMIDE 40 MG/1
40 TABLET, FILM COATED ORAL DAILY PRN
Qty: 12 TABLET | Refills: 5 | Status: SHIPPED | OUTPATIENT
Start: 2022-03-22 | End: 2022-08-08 | Stop reason: SDUPTHER

## 2022-03-22 NOTE — TELEPHONE ENCOUNTER
Incoming Refill Request      Medication requested (name and dose): eletriptan (RELPAX) 40 MG tablet    Pharmacy where request should be sent: [facility]     Additional details provided by patient:     Best call back number: [phone]    Does the patient have less than a 3 day supply:  [x] Yes  [] No    Prashant Estrada Rep  03/22/22, 14:43 CDT

## 2022-04-06 ENCOUNTER — ANESTHESIA (OUTPATIENT)
Dept: GASTROENTEROLOGY | Facility: HOSPITAL | Age: 47
End: 2022-04-06

## 2022-04-06 ENCOUNTER — ANESTHESIA EVENT (OUTPATIENT)
Dept: GASTROENTEROLOGY | Facility: HOSPITAL | Age: 47
End: 2022-04-06

## 2022-04-06 ENCOUNTER — HOSPITAL ENCOUNTER (OUTPATIENT)
Facility: HOSPITAL | Age: 47
Setting detail: HOSPITAL OUTPATIENT SURGERY
Discharge: HOME | End: 2022-04-06
Attending: INTERNAL MEDICINE | Admitting: INTERNAL MEDICINE

## 2022-04-06 VITALS
RESPIRATION RATE: 18 BRPM | OXYGEN SATURATION: 97 % | TEMPERATURE: 96.6 F | HEIGHT: 67 IN | BODY MASS INDEX: 32.71 KG/M2 | SYSTOLIC BLOOD PRESSURE: 119 MMHG | DIASTOLIC BLOOD PRESSURE: 75 MMHG | WEIGHT: 208.4 LBS | HEART RATE: 73 BPM

## 2022-04-06 DIAGNOSIS — Z12.11 ENCOUNTER FOR SCREENING FOR MALIGNANT NEOPLASM OF COLON: ICD-10-CM

## 2022-04-06 PROCEDURE — 45378 DIAGNOSTIC COLONOSCOPY: CPT | Performed by: INTERNAL MEDICINE

## 2022-04-06 PROCEDURE — 25010000002 PROPOFOL 10 MG/ML EMULSION

## 2022-04-06 RX ORDER — PROMETHAZINE HYDROCHLORIDE 25 MG/1
25 TABLET ORAL ONCE AS NEEDED
Status: DISCONTINUED | OUTPATIENT
Start: 2022-04-06 | End: 2022-04-06 | Stop reason: HOSPADM

## 2022-04-06 RX ORDER — DEXTROSE AND SODIUM CHLORIDE 5; .45 G/100ML; G/100ML
30 INJECTION, SOLUTION INTRAVENOUS CONTINUOUS PRN
Status: DISCONTINUED | OUTPATIENT
Start: 2022-04-06 | End: 2022-04-06 | Stop reason: HOSPADM

## 2022-04-06 RX ORDER — PROPOFOL 10 MG/ML
VIAL (ML) INTRAVENOUS AS NEEDED
Status: DISCONTINUED | OUTPATIENT
Start: 2022-04-06 | End: 2022-04-06 | Stop reason: SURG

## 2022-04-06 RX ORDER — MEPERIDINE HYDROCHLORIDE 25 MG/ML
12.5 INJECTION INTRAMUSCULAR; INTRAVENOUS; SUBCUTANEOUS
Status: DISCONTINUED | OUTPATIENT
Start: 2022-04-06 | End: 2022-04-06 | Stop reason: HOSPADM

## 2022-04-06 RX ORDER — PROMETHAZINE HYDROCHLORIDE 25 MG/1
25 SUPPOSITORY RECTAL ONCE AS NEEDED
Status: DISCONTINUED | OUTPATIENT
Start: 2022-04-06 | End: 2022-04-06 | Stop reason: HOSPADM

## 2022-04-06 RX ORDER — ONDANSETRON 2 MG/ML
4 INJECTION INTRAMUSCULAR; INTRAVENOUS ONCE AS NEEDED
Status: DISCONTINUED | OUTPATIENT
Start: 2022-04-06 | End: 2022-04-06 | Stop reason: HOSPADM

## 2022-04-06 RX ORDER — LIDOCAINE HYDROCHLORIDE 20 MG/ML
INJECTION, SOLUTION INTRAVENOUS AS NEEDED
Status: DISCONTINUED | OUTPATIENT
Start: 2022-04-06 | End: 2022-04-06 | Stop reason: SURG

## 2022-04-06 RX ADMIN — DEXTROSE AND SODIUM CHLORIDE 30 ML/HR: 5; 450 INJECTION, SOLUTION INTRAVENOUS at 11:33

## 2022-04-06 RX ADMIN — LIDOCAINE HYDROCHLORIDE 100 MG: 20 INJECTION, SOLUTION INTRAVENOUS at 12:14

## 2022-04-06 RX ADMIN — PROPOFOL 160 MG: 10 INJECTION, EMULSION INTRAVENOUS at 12:14

## 2022-04-06 NOTE — ANESTHESIA PREPROCEDURE EVALUATION
Anesthesia Evaluation     Patient summary reviewed   NPO Solid Status: > 8 hours  NPO Liquid Status: > 4 hours           Airway   Mallampati: II  TM distance: >3 FB  Neck ROM: limited  Possible difficult intubation  Dental - normal exam     Pulmonary     breath sounds clear to auscultation  (+) asthma,  (-) recent URI  Cardiovascular     Rhythm: regular  Rate: normal    (+) hypertension well controlled less than 2 medications,       Neuro/Psych  (-) headaches, dizziness/light headedness, psychiatric history  GI/Hepatic/Renal/Endo    (+) obesity,       Musculoskeletal     (+) neck pain,   Abdominal    Substance History - negative use     OB/GYN negative ob/gyn ROS         Other - negative ROS                     Anesthesia Plan    ASA 2     MAC     intravenous induction     Anesthetic plan, all risks, benefits, and alternatives have been provided, discussed and informed consent has been obtained with: patient.        CODE STATUS:

## 2022-04-06 NOTE — ANESTHESIA POSTPROCEDURE EVALUATION
Patient: Marissa Bunch    Procedure Summary     Date: 04/06/22 Room / Location: [facility] ENDOSCOPY 1 / [facility] ENDOSCOPY    Anesthesia Start: 1214 Anesthesia Stop: 1227    Procedure: COLONOSCOPY (N/A ) Diagnosis:       Encounter for screening for malignant neoplasm of colon      (Encounter for screening for malignant neoplasm of colon [Z12.11])    Surgeons: Rip Rodgers MD Provider: Joyce Pillai CRNA    Anesthesia Type: MAC ASA Status: 2          Anesthesia Type: MAC    Vitals  No vitals data found for the desired time range.          Post Anesthesia Care and Evaluation    Patient location during evaluation: bedside  Patient participation: waiting for patient participation  Level of consciousness: sleepy but conscious  Pain score: 0  Pain management: adequate  Airway patency: patent  Anesthetic complications: No anesthetic complications  PONV Status: none  Cardiovascular status: acceptable  Respiratory status: acceptable  Hydration status: acceptable    Comments: ---------------------------               04/06/22                      1125         ---------------------------   BP:          142/94         Pulse:         84           Resp:          16           Temp:   97.4 °F (36.3 °C)   SpO2:          97%         ---------------------------

## 2022-04-26 RX ORDER — VERAPAMIL HYDROCHLORIDE 240 MG/1
TABLET, FILM COATED, EXTENDED RELEASE ORAL
Qty: 60 TABLET | Refills: 11 | Status: SHIPPED | OUTPATIENT
Start: 2022-04-26 | End: 2022-08-08 | Stop reason: SDUPTHER

## 2022-05-16 RX ORDER — PAROXETINE 10 MG/1
10 TABLET, FILM COATED ORAL EVERY MORNING
Qty: 30 TABLET | Refills: 11 | Status: SHIPPED | OUTPATIENT
Start: 2022-05-16

## 2022-05-16 NOTE — TELEPHONE ENCOUNTER
The original prescription was discontinued on 4/4/2022 by Shanita Huizar RN. Renewing this prescription may not be appropriate.    Not listed as a current medication.     Last OV 02/23/2022

## 2022-07-20 ENCOUNTER — TELEPHONE (OUTPATIENT)
Dept: FAMILY MEDICINE CLINIC | Facility: CLINIC | Age: 47
End: 2022-07-20

## 2022-07-20 DIAGNOSIS — Z12.31 ENCOUNTER FOR SCREENING MAMMOGRAM FOR MALIGNANT NEOPLASM OF BREAST: Primary | ICD-10-CM

## 2022-07-20 NOTE — TELEPHONE ENCOUNTER
Pt made physical appt for 8/8/22 at 2:45 and needs orders for mammogram put in please and mmaogram set up [phone]

## 2022-08-08 ENCOUNTER — LAB (OUTPATIENT)
Dept: LAB | Facility: HOSPITAL | Age: 47
End: 2022-08-08

## 2022-08-08 ENCOUNTER — OFFICE VISIT (OUTPATIENT)
Dept: FAMILY MEDICINE CLINIC | Facility: CLINIC | Age: 47
End: 2022-08-08

## 2022-08-08 VITALS
OXYGEN SATURATION: 98 % | SYSTOLIC BLOOD PRESSURE: 120 MMHG | WEIGHT: 206 LBS | HEART RATE: 65 BPM | BODY MASS INDEX: 32.33 KG/M2 | DIASTOLIC BLOOD PRESSURE: 84 MMHG | HEIGHT: 67 IN

## 2022-08-08 DIAGNOSIS — F51.01 PRIMARY INSOMNIA: ICD-10-CM

## 2022-08-08 DIAGNOSIS — R53.83 MALAISE AND FATIGUE: ICD-10-CM

## 2022-08-08 DIAGNOSIS — R53.81 MALAISE AND FATIGUE: ICD-10-CM

## 2022-08-08 DIAGNOSIS — F41.9 ANXIETY: ICD-10-CM

## 2022-08-08 DIAGNOSIS — Z00.00 GENERAL MEDICAL EXAM: Primary | ICD-10-CM

## 2022-08-08 PROCEDURE — 36415 COLL VENOUS BLD VENIPUNCTURE: CPT | Performed by: NURSE PRACTITIONER

## 2022-08-08 PROCEDURE — 80050 GENERAL HEALTH PANEL: CPT | Performed by: NURSE PRACTITIONER

## 2022-08-08 PROCEDURE — 83540 ASSAY OF IRON: CPT | Performed by: NURSE PRACTITIONER

## 2022-08-08 PROCEDURE — 80061 LIPID PANEL: CPT | Performed by: NURSE PRACTITIONER

## 2022-08-08 PROCEDURE — 99396 PREV VISIT EST AGE 40-64: CPT | Performed by: NURSE PRACTITIONER

## 2022-08-08 PROCEDURE — 82607 VITAMIN B-12: CPT | Performed by: NURSE PRACTITIONER

## 2022-08-08 PROCEDURE — 82306 VITAMIN D 25 HYDROXY: CPT | Performed by: NURSE PRACTITIONER

## 2022-08-08 PROCEDURE — 83036 HEMOGLOBIN GLYCOSYLATED A1C: CPT | Performed by: NURSE PRACTITIONER

## 2022-08-08 RX ORDER — ALBUTEROL SULFATE 90 UG/1
2 AEROSOL, METERED RESPIRATORY (INHALATION) 4 TIMES DAILY PRN
Qty: 6.7 G | Refills: 5 | Status: SHIPPED | OUTPATIENT
Start: 2022-08-08 | End: 2023-01-05

## 2022-08-08 RX ORDER — CLINDAMYCIN PHOSPHATE 100 MG/1
100 SUPPOSITORY VAGINAL NIGHTLY
Qty: 3 SUPPOSITORY | Refills: 5 | Status: SHIPPED | OUTPATIENT
Start: 2022-08-08

## 2022-08-08 RX ORDER — ZOLPIDEM TARTRATE 10 MG/1
10 TABLET ORAL NIGHTLY PRN
Qty: 30 TABLET | Refills: 2 | Status: SHIPPED | OUTPATIENT
Start: 2022-08-08 | End: 2023-01-19 | Stop reason: SDUPTHER

## 2022-08-08 RX ORDER — VERAPAMIL HYDROCHLORIDE 240 MG/1
240 TABLET, FILM COATED, EXTENDED RELEASE ORAL 2 TIMES DAILY
Qty: 60 TABLET | Refills: 11 | Status: SHIPPED | OUTPATIENT
Start: 2022-08-08

## 2022-08-08 RX ORDER — ATORVASTATIN CALCIUM 10 MG/1
10 TABLET, FILM COATED ORAL NIGHTLY
Qty: 90 TABLET | Refills: 3 | Status: SHIPPED | OUTPATIENT
Start: 2022-08-08

## 2022-08-08 RX ORDER — ELETRIPTAN HYDROBROMIDE 40 MG/1
40 TABLET, FILM COATED ORAL DAILY PRN
Qty: 18 TABLET | Refills: 11 | Status: SHIPPED | OUTPATIENT
Start: 2022-08-08

## 2022-08-08 NOTE — PROGRESS NOTES
Chief Complaint   Patient presents with   • Annual Exam     Mammogram is scheduled afer visit     Subjective   Marissa Bunch is a 47 y.o. female.           History of Present Illness  Presents with general exam and needing refills of medication. Mammogram scheduled after leaving office.   Insomnia  This is a chronic problem. The problem occurs daily. The problem has been resolved. Associated symptoms include coughing and joint swelling. Pertinent negatives include no abdominal pain, arthralgias, chest pain, chills, congestion, diaphoresis, fatigue, fever, headaches, myalgias, nausea, neck pain, numbness, sore throat, swollen glands, urinary symptoms, vertigo, visual change, vomiting or weakness. The treatment provided significant relief.   Fatigue  This is a recurrent problem. The current episode started more than 1 month ago. The problem has been resolved. Associated symptoms include coughing and joint swelling. Pertinent negatives include no abdominal pain, arthralgias, chest pain, chills, congestion, diaphoresis, fatigue, fever, headaches, myalgias, nausea, neck pain, numbness, sore throat, swollen glands, urinary symptoms, vertigo, visual change, vomiting or weakness. The treatment provided mild relief.   Anxiety  Presents for follow-up visit. Symptoms include excessive worry, insomnia, irritability and malaise. Patient reports no chest pain, compulsions, confusion, decreased concentration, dizziness, feeling of choking, nausea, nervous/anxious behavior, palpitations, shortness of breath or suicidal ideas. Symptoms occur rarely.     Compliance with medications is %.        The following portions of the patient's history were reviewed and updated as appropriate: allergies, current medications, past social history and problem list.    Review of Systems   Constitutional: Positive for irritability. Negative for activity change, appetite change, chills, diaphoresis, fatigue, fever and unexpected weight  "change.   HENT: Negative for congestion, dental problem, drooling, ear discharge, ear pain, facial swelling, hearing loss, mouth sores, nosebleeds, postnasal drip, rhinorrhea, sinus pressure, sinus pain, sneezing, sore throat and trouble swallowing.    Eyes: Negative for pain, discharge, redness, itching and visual disturbance.   Respiratory: Positive for cough. Negative for apnea, choking, chest tightness, shortness of breath, wheezing and stridor.    Cardiovascular: Negative for chest pain, palpitations and leg swelling.   Gastrointestinal: Negative for abdominal distention, abdominal pain, anal bleeding, blood in stool, constipation, diarrhea, nausea and vomiting.   Endocrine: Negative for cold intolerance, heat intolerance, polydipsia, polyphagia and polyuria.   Genitourinary: Negative for dysuria, enuresis, flank pain, frequency and urgency.   Musculoskeletal: Positive for joint swelling. Negative for arthralgias, back pain, gait problem, myalgias, neck pain and neck stiffness.   Skin: Negative for color change, pallor and wound.   Allergic/Immunologic: Negative.  Negative for environmental allergies, food allergies and immunocompromised state.   Neurological: Negative.  Negative for dizziness, vertigo, tremors, seizures, syncope, facial asymmetry, speech difficulty, weakness, light-headedness, numbness and headaches.   Hematological: Negative.  Negative for adenopathy. Does not bruise/bleed easily.   Psychiatric/Behavioral: Positive for sleep disturbance. Negative for agitation, behavioral problems, confusion, decreased concentration, dysphoric mood, hallucinations, self-injury and suicidal ideas. The patient has insomnia. The patient is not nervous/anxious and is not hyperactive.        Objective   /84   Pulse 65   Ht 170.2 cm (67\")   Wt 93.4 kg (206 lb)   SpO2 98%   BMI 32.26 kg/m²   Physical Exam  Vitals and nursing note reviewed.   Constitutional:       General: She is not in acute distress.     " Appearance: Normal appearance. She is well-developed. She is not ill-appearing, toxic-appearing or diaphoretic.   HENT:      Head: Normocephalic and atraumatic.      Right Ear: Tympanic membrane normal. There is no impacted cerumen.      Left Ear: Tympanic membrane normal. There is no impacted cerumen.      Nose: No congestion or rhinorrhea.      Mouth/Throat:      Mouth: Mucous membranes are moist.      Pharynx: No oropharyngeal exudate or posterior oropharyngeal erythema.   Eyes:      General: Lids are normal. No scleral icterus.        Right eye: No discharge.         Left eye: No discharge.      Conjunctiva/sclera: Conjunctivae normal.   Neck:      Thyroid: No thyroid mass or thyromegaly.      Trachea: Trachea normal. No tracheal tenderness.   Cardiovascular:      Rate and Rhythm: Normal rate and regular rhythm.      Pulses: Normal pulses.      Heart sounds: Normal heart sounds. No murmur heard.    No friction rub. No gallop.   Pulmonary:      Effort: Pulmonary effort is normal. No respiratory distress.      Breath sounds: Normal breath sounds. No stridor. No wheezing, rhonchi or rales.   Chest:      Chest wall: No tenderness.   Abdominal:      General: There is no distension.      Palpations: Abdomen is soft. There is no mass.      Tenderness: There is no abdominal tenderness. There is no guarding or rebound.      Hernia: No hernia is present.   Musculoskeletal:         General: Tenderness present. No swelling, deformity or signs of injury. Normal range of motion.      Cervical back: Normal range of motion. No edema.      Thoracic back: Spasms and tenderness present.        Back:       Right lower leg: No edema.      Left lower leg: No edema.   Lymphadenopathy:      Head:      Right side of head: No submental, submandibular or tonsillar adenopathy.      Left side of head: No submental, submandibular or tonsillar adenopathy.   Skin:     General: Skin is warm and dry.      Coloration: Skin is not jaundiced or  pale.      Findings: No abrasion, bruising, erythema, lesion or rash.   Neurological:      General: No focal deficit present.      Mental Status: She is alert and oriented to person, place, and time.      Cranial Nerves: No cranial nerve deficit.      Sensory: No sensory deficit.      Motor: No weakness.      Coordination: Coordination normal.      Gait: Gait normal.      Deep Tendon Reflexes: Reflexes normal.   Psychiatric:         Mood and Affect: Mood is not anxious. Affect is not inappropriate.         Speech: Speech normal.         Behavior: Behavior normal.         Thought Content: Thought content normal.         Judgment: Judgment normal. Judgment is not impulsive.              Assessment & Plan     Problems Addressed this Visit        Mental Health    Anxiety    Relevant Medications    zolpidem (Ambien) 10 MG tablet    Other Relevant Orders    CBC & Differential (Completed)    Comprehensive Metabolic Panel (Completed)    Hemoglobin A1c (Completed)    Iron (Completed)    Lipid Panel (Completed)    Vitamin D 25 Hydroxy (Completed)    Vitamin B12 (Completed)    TSH (Completed)       Sleep    Insomnia    Relevant Medications    zolpidem (Ambien) 10 MG tablet    Other Relevant Orders    CBC & Differential (Completed)    Comprehensive Metabolic Panel (Completed)    Hemoglobin A1c (Completed)    Iron (Completed)    Lipid Panel (Completed)    Vitamin D 25 Hydroxy (Completed)    Vitamin B12 (Completed)    TSH (Completed)       Symptoms and Signs    Malaise and fatigue   Other Visit Diagnoses     General medical exam    -  Primary      Diagnoses       Codes Comments    General medical exam    -  Primary ICD-10-CM: Z00.00  ICD-9-CM: V70.9     Anxiety     ICD-10-CM: F41.9  ICD-9-CM: 300.00     Primary insomnia     ICD-10-CM: F51.01  ICD-9-CM: 307.42     Malaise and fatigue     ICD-10-CM: R53.81, R53.83  ICD-9-CM: 780.79            New Medications Ordered This Visit   Medications   • atorvastatin (Lipitor) 10 MG tablet      Sig: Take 1 tablet by mouth Every Night.     Dispense:  90 tablet     Refill:  3   • eletriptan (RELPAX) 40 MG tablet     Sig: Take 1 tablet by mouth Daily As Needed for Headache. may repeat in 2 hours if necessary     Dispense:  18 tablet     Refill:  11   • albuterol sulfate  (90 Base) MCG/ACT inhaler     Sig: Inhale 2 puffs 4 (Four) Times a Day As Needed for Wheezing.     Dispense:  6.7 g     Refill:  5   • clindamycin (Cleocin) 100 MG vaginal suppository     Sig: Insert 1 suppository into the vagina Every Night.     Dispense:  3 suppository     Refill:  5   • verapamil SR (CALAN-SR) 240 MG CR tablet     Sig: Take 1 tablet by mouth 2 (Two) Times a Day.     Dispense:  60 tablet     Refill:  11   • zolpidem (Ambien) 10 MG tablet     Sig: Take 1 tablet by mouth At Night As Needed for Sleep. Needs appointment for refills     Dispense:  30 tablet     Refill:  2     Current Outpatient Medications on File Prior to Visit   Medication Sig Dispense Refill   • baclofen (LIORESAL) 10 MG tablet Take 1 tablet by mouth 3 (Three) Times a Day. (Patient taking differently: Take 10 mg by mouth 3 (Three) Times a Day As Needed.) 60 tablet 11   • PARoxetine (PAXIL) 10 MG tablet TAKE 1 TABLET BY MOUTH EVERY MORNING 30 tablet 11     No current facility-administered medications on file prior to visit.       15 minutes   Follow Up   Return in about 6 months (around 2/8/2023).     /It's not just what you eat, but when you eat  Eat breakfast, and eat smaller meals throughout the day. A healthy breakfast can jumpstart your metabolism, while eating small, healthy meals (rather than the standard three large meals) keeps your energy up.   Avoid eating at night. Try to eat dinner earlier and fast for 14-16 hours until breakfast the next morning. Studies suggest that eating only when you’re most active and giving your digestive system a long break each day may help to regulate weight.     Patient understands the risks associated with  this controlled medication, including tolerance and addiction.  she also agrees to only obtain this medication from me, and not from a another provider, unless that provider is covering for me in my absence.  she also agrees to be compliant in dosing, and not self adjust the dose of medication.  A signed controlled substance agreement is on file, and she has received a controlled substance education sheet at this a previous visit.  she has also signed a consent for treatment with a controlled substance as per [facility] policy. OLYA was obtained.      meds as directed, diet discussed, follow up as directed in 6 months, mammogram today

## 2022-08-09 LAB
25(OH)D3 SERPL-MCNC: 41.3 NG/ML (ref 30–100)
ALBUMIN SERPL-MCNC: 4.3 G/DL (ref 3.5–5.2)
ALBUMIN/GLOB SERPL: 1.5 G/DL
ALP SERPL-CCNC: 101 U/L (ref 39–117)
ALT SERPL W P-5'-P-CCNC: 17 U/L (ref 1–33)
ANION GAP SERPL CALCULATED.3IONS-SCNC: 10.2 MMOL/L (ref 5–15)
AST SERPL-CCNC: 19 U/L (ref 1–32)
BASOPHILS # BLD AUTO: 0.05 10*3/MM3 (ref 0–0.2)
BASOPHILS NFR BLD AUTO: 0.6 % (ref 0–1.5)
BILIRUB SERPL-MCNC: 0.3 MG/DL (ref 0–1.2)
BUN SERPL-MCNC: 12 MG/DL (ref 6–20)
BUN/CREAT SERPL: 12.9 (ref 7–25)
CALCIUM SPEC-SCNC: 9.3 MG/DL (ref 8.6–10.5)
CHLORIDE SERPL-SCNC: 103 MMOL/L (ref 98–107)
CHOLEST SERPL-MCNC: 147 MG/DL (ref 0–200)
CO2 SERPL-SCNC: 27.8 MMOL/L (ref 22–29)
CREAT SERPL-MCNC: 0.93 MG/DL (ref 0.57–1)
DEPRECATED RDW RBC AUTO: 43.7 FL (ref 37–54)
EGFRCR SERPLBLD CKD-EPI 2021: 75.5 ML/MIN/1.73
EOSINOPHIL # BLD AUTO: 0.19 10*3/MM3 (ref 0–0.4)
EOSINOPHIL NFR BLD AUTO: 2.1 % (ref 0.3–6.2)
ERYTHROCYTE [DISTWIDTH] IN BLOOD BY AUTOMATED COUNT: 13.1 % (ref 12.3–15.4)
GLOBULIN UR ELPH-MCNC: 2.8 GM/DL
GLUCOSE SERPL-MCNC: 91 MG/DL (ref 65–99)
HBA1C MFR BLD: 5.6 % (ref 4.8–5.6)
HCT VFR BLD AUTO: 40.8 % (ref 34–46.6)
HDLC SERPL-MCNC: 56 MG/DL (ref 40–60)
HGB BLD-MCNC: 13.4 G/DL (ref 12–15.9)
IMM GRANULOCYTES # BLD AUTO: 0.03 10*3/MM3 (ref 0–0.05)
IMM GRANULOCYTES NFR BLD AUTO: 0.3 % (ref 0–0.5)
IRON 24H UR-MRATE: 63 MCG/DL (ref 37–145)
LDLC SERPL CALC-MCNC: 76 MG/DL (ref 0–100)
LDLC/HDLC SERPL: 1.35 {RATIO}
LYMPHOCYTES # BLD AUTO: 1.93 10*3/MM3 (ref 0.7–3.1)
LYMPHOCYTES NFR BLD AUTO: 21.8 % (ref 19.6–45.3)
MCH RBC QN AUTO: 30 PG (ref 26.6–33)
MCHC RBC AUTO-ENTMCNC: 32.8 G/DL (ref 31.5–35.7)
MCV RBC AUTO: 91.5 FL (ref 79–97)
MONOCYTES # BLD AUTO: 0.54 10*3/MM3 (ref 0.1–0.9)
MONOCYTES NFR BLD AUTO: 6.1 % (ref 5–12)
NEUTROPHILS NFR BLD AUTO: 6.12 10*3/MM3 (ref 1.7–7)
NEUTROPHILS NFR BLD AUTO: 69.1 % (ref 42.7–76)
NRBC BLD AUTO-RTO: 0.1 /100 WBC (ref 0–0.2)
PLATELET # BLD AUTO: 252 10*3/MM3 (ref 140–450)
PMV BLD AUTO: 11 FL (ref 6–12)
POTASSIUM SERPL-SCNC: 3.8 MMOL/L (ref 3.5–5.2)
PROT SERPL-MCNC: 7.1 G/DL (ref 6–8.5)
RBC # BLD AUTO: 4.46 10*6/MM3 (ref 3.77–5.28)
SODIUM SERPL-SCNC: 141 MMOL/L (ref 136–145)
TRIGL SERPL-MCNC: 77 MG/DL (ref 0–150)
TSH SERPL DL<=0.05 MIU/L-ACNC: 0.74 UIU/ML (ref 0.27–4.2)
VIT B12 BLD-MCNC: 561 PG/ML (ref 211–946)
VLDLC SERPL-MCNC: 15 MG/DL (ref 5–40)
WBC NRBC COR # BLD: 8.86 10*3/MM3 (ref 3.4–10.8)

## 2022-08-11 RX ORDER — FLUTICASONE PROPIONATE 110 UG/1
1 AEROSOL, METERED RESPIRATORY (INHALATION)
Qty: 12 G | Refills: 11 | Status: SHIPPED | OUTPATIENT
Start: 2022-08-11

## 2022-08-12 RX ORDER — FLUTICASONE PROPIONATE 110 UG/1
1 AEROSOL, METERED RESPIRATORY (INHALATION)
Qty: 12 G | Refills: 11 | Status: SHIPPED | OUTPATIENT
Start: 2022-08-12

## 2022-09-09 ENCOUNTER — TELEPHONE (OUTPATIENT)
Dept: FAMILY MEDICINE CLINIC | Facility: CLINIC | Age: 47
End: 2022-09-09

## 2022-11-28 ENCOUNTER — TELEPHONE (OUTPATIENT)
Dept: FAMILY MEDICINE CLINIC | Facility: CLINIC | Age: 47
End: 2022-11-28

## 2023-01-05 RX ORDER — ALBUTEROL SULFATE 90 UG/1
AEROSOL, METERED RESPIRATORY (INHALATION)
Qty: 8.5 G | Refills: 1 | Status: SHIPPED | OUTPATIENT
Start: 2023-01-05

## 2023-01-19 DIAGNOSIS — F51.01 PRIMARY INSOMNIA: ICD-10-CM

## 2023-01-19 DIAGNOSIS — F41.9 ANXIETY: ICD-10-CM

## 2023-01-19 RX ORDER — ZOLPIDEM TARTRATE 10 MG/1
TABLET ORAL
Qty: 30 TABLET | OUTPATIENT
Start: 2023-01-19

## 2023-01-19 RX ORDER — ZOLPIDEM TARTRATE 10 MG/1
10 TABLET ORAL NIGHTLY PRN
Qty: 30 TABLET | Refills: 0 | Status: SHIPPED | OUTPATIENT
Start: 2023-01-19

## 2023-01-19 NOTE — TELEPHONE ENCOUNTER
Ms. Bunch has been called and advised that she will need to be seen before another refill can be sent on her Ambien.   She states she just started a new job and it will be at least 3 weeks before she could schedule, she said she would call back

## 2023-04-03 DIAGNOSIS — F51.01 PRIMARY INSOMNIA: ICD-10-CM

## 2023-04-03 DIAGNOSIS — F41.9 ANXIETY: ICD-10-CM

## 2023-04-04 RX ORDER — ZOLPIDEM TARTRATE 10 MG/1
TABLET ORAL
Qty: 30 TABLET | Refills: 0 | OUTPATIENT
Start: 2023-04-04

## 2023-04-04 NOTE — TELEPHONE ENCOUNTER
JOSE Hawkins Pt    I have called and advised the patient that she was past due for follow-up and she needs to be seen.  I did offer to schedule an appointment, but she did not want to at this time. She said she will call back once she has new insurance.      Last Rx 01/19/2023  #30, NR    No Future Ov Sh    Last Ov 08/2022    Past Due for Follow-up

## 2023-06-19 RX ORDER — ALBUTEROL SULFATE 90 UG/1
AEROSOL, METERED RESPIRATORY (INHALATION)
Qty: 8.5 G | Refills: 2 | Status: SHIPPED | OUTPATIENT
Start: 2023-06-19

## 2023-06-19 RX ORDER — PAROXETINE 10 MG/1
10 TABLET, FILM COATED ORAL EVERY MORNING
Qty: 30 TABLET | Refills: 11 | Status: SHIPPED | OUTPATIENT
Start: 2023-06-19

## 2023-07-28 ENCOUNTER — OFFICE VISIT (OUTPATIENT)
Dept: FAMILY MEDICINE CLINIC | Facility: CLINIC | Age: 48
End: 2023-07-28
Payer: COMMERCIAL

## 2023-07-28 VITALS
DIASTOLIC BLOOD PRESSURE: 80 MMHG | WEIGHT: 214 LBS | OXYGEN SATURATION: 95 % | SYSTOLIC BLOOD PRESSURE: 130 MMHG | HEART RATE: 76 BPM | HEIGHT: 67 IN | BODY MASS INDEX: 33.59 KG/M2

## 2023-07-28 DIAGNOSIS — F41.9 ANXIETY: ICD-10-CM

## 2023-07-28 DIAGNOSIS — F51.01 PRIMARY INSOMNIA: Primary | ICD-10-CM

## 2023-07-28 DIAGNOSIS — Z12.31 VISIT FOR SCREENING MAMMOGRAM: ICD-10-CM

## 2023-07-28 DIAGNOSIS — Z00.00 GENERAL MEDICAL EXAM: ICD-10-CM

## 2023-07-28 RX ORDER — ZOLPIDEM TARTRATE 10 MG/1
10 TABLET ORAL NIGHTLY PRN
Qty: 30 TABLET | Refills: 3 | Status: SHIPPED | OUTPATIENT
Start: 2023-07-28

## 2023-07-28 RX ORDER — ELETRIPTAN HYDROBROMIDE 40 MG/1
40 TABLET, FILM COATED ORAL DAILY PRN
Qty: 18 TABLET | Refills: 11 | Status: SHIPPED | OUTPATIENT
Start: 2023-07-28 | End: 2023-07-31

## 2023-07-28 RX ORDER — ONDANSETRON 4 MG/1
4 TABLET, ORALLY DISINTEGRATING ORAL EVERY 8 HOURS PRN
Qty: 15 TABLET | Refills: 11 | Status: SHIPPED | OUTPATIENT
Start: 2023-07-28 | End: 2023-07-31 | Stop reason: ALTCHOICE

## 2023-07-28 NOTE — PROGRESS NOTES
Chief Complaint   Patient presents with    Annual Exam     Subjective   Marissa Bunch is a 48 y.o. female.           Obesity  This is a chronic problem. The current episode started yesterday. The problem occurs daily. The problem has been gradually worsening. Associated symptoms include fatigue and joint swelling. Pertinent negatives include no abdominal pain, arthralgias, chest pain, chills, congestion, coughing, diaphoresis, fever, headaches, myalgias, nausea, neck pain, numbness, sore throat, vertigo, vomiting or weakness. The treatment provided mild relief.   Insomnia  This is a chronic problem. The problem occurs daily. The problem has been resolved. Associated symptoms include fatigue and joint swelling. Pertinent negatives include no abdominal pain, arthralgias, chest pain, chills, congestion, coughing, diaphoresis, fever, headaches, myalgias, nausea, neck pain, numbness, sore throat, vertigo, vomiting or weakness. The treatment provided significant relief.      The following portions of the patient's history were reviewed and updated as appropriate: allergies, current medications, past social history and problem list.    Review of Systems   Constitutional:  Positive for fatigue and unexpected weight change. Negative for activity change, appetite change, chills, diaphoresis and fever.        Reports 20 pound weight gain    HENT:  Negative for congestion, dental problem, drooling, ear discharge, ear pain, facial swelling, hearing loss, mouth sores, nosebleeds, postnasal drip, rhinorrhea, sinus pressure, sinus pain, sneezing, sore throat and trouble swallowing.    Eyes:  Negative for photophobia, pain, discharge, redness, itching and visual disturbance.   Respiratory:  Negative for apnea, cough, choking, chest tightness, shortness of breath, wheezing and stridor.    Cardiovascular:  Negative for chest pain, palpitations and leg swelling.   Gastrointestinal:  Negative for abdominal distention, abdominal  "pain, anal bleeding, blood in stool, constipation, diarrhea, nausea and vomiting.   Endocrine: Negative for cold intolerance, heat intolerance, polydipsia, polyphagia and polyuria.   Genitourinary:  Negative for difficulty urinating, dyspareunia, dysuria, enuresis, flank pain, frequency and urgency.   Musculoskeletal:  Positive for joint swelling. Negative for arthralgias, back pain, gait problem, myalgias, neck pain and neck stiffness.   Skin:  Negative for color change, pallor and wound.   Allergic/Immunologic: Negative.  Negative for environmental allergies, food allergies and immunocompromised state.   Neurological: Negative.  Negative for dizziness, vertigo, tremors, seizures, syncope, facial asymmetry, speech difficulty, weakness, light-headedness, numbness and headaches.   Hematological: Negative.  Negative for adenopathy. Does not bruise/bleed easily.   Psychiatric/Behavioral:  Positive for sleep disturbance. Negative for agitation, behavioral problems, confusion, decreased concentration, dysphoric mood, hallucinations, self-injury and suicidal ideas. The patient has insomnia. The patient is not nervous/anxious and is not hyperactive.      Objective   /80   Pulse 76   Ht 170.2 cm (67\")   Wt 97.1 kg (214 lb)   SpO2 95%   BMI 33.52 kg/m²   Physical Exam  Vitals and nursing note reviewed.   Constitutional:       General: She is not in acute distress.     Appearance: Normal appearance. She is well-developed. She is not ill-appearing, toxic-appearing or diaphoretic.   HENT:      Head: Normocephalic and atraumatic.      Right Ear: Tympanic membrane normal. There is no impacted cerumen.      Left Ear: Tympanic membrane normal. There is no impacted cerumen.      Nose: No congestion or rhinorrhea.      Mouth/Throat:      Mouth: Mucous membranes are moist.      Pharynx: No oropharyngeal exudate or posterior oropharyngeal erythema.   Eyes:      General: Lids are normal. No scleral icterus.        Right eye: " No discharge.         Left eye: No discharge.      Conjunctiva/sclera: Conjunctivae normal.   Neck:      Thyroid: No thyroid mass or thyromegaly.      Trachea: Trachea normal. No tracheal tenderness.   Cardiovascular:      Rate and Rhythm: Normal rate and regular rhythm.      Pulses: Normal pulses.      Heart sounds: Normal heart sounds. No murmur heard.    No friction rub. No gallop.   Pulmonary:      Effort: Pulmonary effort is normal. No respiratory distress.      Breath sounds: Normal breath sounds. No stridor. No wheezing, rhonchi or rales.   Chest:      Chest wall: No tenderness.   Abdominal:      General: There is no distension.      Palpations: Abdomen is soft. There is no mass.      Tenderness: There is no abdominal tenderness. There is no guarding or rebound.      Hernia: No hernia is present.   Musculoskeletal:         General: No swelling, deformity or signs of injury. Normal range of motion.      Cervical back: Normal range of motion. No edema.      Thoracic back: Spasms and tenderness present.      Right lower leg: No edema.      Left lower leg: No edema.   Lymphadenopathy:      Head:      Right side of head: No submental, submandibular or tonsillar adenopathy.      Left side of head: No submental, submandibular or tonsillar adenopathy.   Skin:     General: Skin is warm and dry.      Coloration: Skin is not jaundiced or pale.      Findings: No abrasion, bruising, erythema, lesion or rash.   Neurological:      General: No focal deficit present.      Mental Status: She is alert and oriented to person, place, and time.      Cranial Nerves: No cranial nerve deficit.      Sensory: No sensory deficit.      Motor: No weakness.      Coordination: Coordination normal.      Gait: Gait normal.      Deep Tendon Reflexes: Reflexes normal.   Psychiatric:         Mood and Affect: Mood is not anxious. Affect is not inappropriate.         Speech: Speech normal.         Behavior: Behavior normal.         Thought  Content: Thought content normal.         Judgment: Judgment normal. Judgment is not impulsive.            Assessment & Plan     Problems Addressed this Visit          Mental Health    Anxiety    Relevant Medications    zolpidem (Ambien) 10 MG tablet       Sleep    Insomnia    Relevant Medications    zolpidem (Ambien) 10 MG tablet     Other Visit Diagnoses       Visit for screening mammogram    -  Primary    Relevant Orders    Mammo Screening Digital Tomosynthesis Bilateral With CAD    General medical exam        Relevant Medications    zolpidem (Ambien) 10 MG tablet    eletriptan (RELPAX) 40 MG tablet    Semaglutide-Weight Management 0.25 MG/0.5ML solution auto-injector    ondansetron ODT (ZOFRAN-ODT) 4 MG disintegrating tablet    Other Relevant Orders    Mammo Screening Digital Tomosynthesis Bilateral With CAD    CBC & Differential    Comprehensive Metabolic Panel    Hemoglobin A1c    Lipid Panel    Vitamin D,25-Hydroxy    Vitamin B12    TSH    Iron          Diagnoses         Codes Comments    Visit for screening mammogram    -  Primary ICD-10-CM: Z12.31  ICD-9-CM: V76.12     Primary insomnia     ICD-10-CM: F51.01  ICD-9-CM: 307.42     Anxiety     ICD-10-CM: F41.9  ICD-9-CM: 300.00     General medical exam     ICD-10-CM: Z00.00  ICD-9-CM: V70.9              New Medications Ordered This Visit   Medications    zolpidem (Ambien) 10 MG tablet     Sig: Take 1 tablet by mouth At Night As Needed for Sleep. Needs appointment for refills     Dispense:  30 tablet     Refill:  3    eletriptan (RELPAX) 40 MG tablet     Sig: Take 1 tablet by mouth Daily As Needed for Headache. may repeat in 2 hours if necessary     Dispense:  18 tablet     Refill:  11    Semaglutide-Weight Management 0.25 MG/0.5ML solution auto-injector     Sig: Inject 0.25 mg under the skin into the appropriate area as directed 1 (One) Time Per Week.     Dispense:  2 mL     Refill:  11    ondansetron ODT (ZOFRAN-ODT) 4 MG disintegrating tablet     Sig: Place 1  tablet on the tongue Every 8 (Eight) Hours As Needed for Nausea or Vomiting.     Dispense:  15 tablet     Refill:  11     Current Outpatient Medications on File Prior to Visit   Medication Sig Dispense Refill    albuterol sulfate  (90 Base) MCG/ACT inhaler INHALE 2 PUFFS BY MOUTH EVERY 4 HOURS AS NEEDED FOR WHEEZING 8.5 g 2    atorvastatin (Lipitor) 10 MG tablet Take 1 tablet by mouth Every Night. 90 tablet 3    baclofen (LIORESAL) 10 MG tablet Take 1 tablet by mouth 3 (Three) Times a Day. (Patient taking differently: Take 1 tablet by mouth 3 (Three) Times a Day As Needed.) 60 tablet 11    clindamycin (Cleocin) 100 MG vaginal suppository Insert 1 suppository into the vagina Every Night. 3 suppository 5    fluticasone (Flovent HFA) 110 MCG/ACT inhaler Inhale 1 puff 2 (Two) Times a Day. 12 g 11    fluticasone (Flovent HFA) 110 MCG/ACT inhaler Inhale 1 puff 2 (Two) Times a Day. 12 g 11    Fluticasone Propionate, Inhal, (Flovent Diskus) 250 MCG/BLIST aerosol powder  Inhale 1 puff 2 (Two) Times a Day As Needed (bid prn). 60 each 11    mometasone-formoterol (DULERA 100) 100-5 MCG/ACT inhaler Inhale 2 puffs 2 (Two) Times a Day. 13 g 11    PARoxetine (PAXIL) 10 MG tablet TAKE 1 TABLET BY MOUTH EVERY MORNING 30 tablet 11    terconazole (TERAZOL 7) 0.4 % vaginal cream INSERT 1 APPLICATORFUL VAGINALLY EVERY NIGHT 45 g 1    verapamil SR (CALAN-SR) 240 MG CR tablet Take 1 tablet by mouth 2 (Two) Times a Day. 60 tablet 11    [DISCONTINUED] eletriptan (RELPAX) 40 MG tablet Take 1 tablet by mouth Daily As Needed for Headache. may repeat in 2 hours if necessary 18 tablet 11    [DISCONTINUED] zolpidem (Ambien) 10 MG tablet Take 1 tablet by mouth At Night As Needed for Sleep. Needs appointment for refills 30 tablet 0     No current facility-administered medications on file prior to visit.       20 minutes  Follow Up   Return in about 6 months (around 1/28/2024).    Patient understands the risks associated with this controlled  medication, including tolerance and addiction.  she also agrees to only obtain this medication from me, and not from a another provider, unless that provider is covering for me in my absence.  she also agrees to be compliant in dosing, and not self adjust the dose of medication.  A signed controlled substance agreement is on file, and she has received a controlled substance education sheet at this a previous visit.  she has also signed a consent for treatment with a controlled substance as per [facility] policy. OLYA was obtained.         Screening mammogram. Labs as directed, meds as directed  See back in 6 months      Patient will benefit from weight loss due to comorbid conditions

## 2023-07-31 ENCOUNTER — TELEPHONE (OUTPATIENT)
Dept: FAMILY MEDICINE CLINIC | Facility: CLINIC | Age: 48
End: 2023-07-31
Payer: COMMERCIAL

## 2023-07-31 RX ORDER — ONDANSETRON 4 MG/1
4 TABLET, FILM COATED ORAL EVERY 8 HOURS PRN
Qty: 15 TABLET | Refills: 1 | Status: SHIPPED | OUTPATIENT
Start: 2023-07-31

## 2023-07-31 RX ORDER — ZOLMITRIPTAN 5 MG/1
TABLET, FILM COATED ORAL
Qty: 9 TABLET | Refills: 11 | Status: SHIPPED | OUTPATIENT
Start: 2023-07-31

## 2023-08-02 ENCOUNTER — TELEPHONE (OUTPATIENT)
Dept: FAMILY MEDICINE CLINIC | Facility: CLINIC | Age: 48
End: 2023-08-02
Payer: COMMERCIAL

## 2023-08-04 ENCOUNTER — LAB (OUTPATIENT)
Dept: LAB | Facility: HOSPITAL | Age: 48
End: 2023-08-04
Payer: COMMERCIAL

## 2023-08-04 LAB
25(OH)D3 SERPL-MCNC: 31.6 NG/ML (ref 30–100)
ALBUMIN SERPL-MCNC: 4.1 G/DL (ref 3.5–5.2)
ALBUMIN/GLOB SERPL: 1.6 G/DL
ALP SERPL-CCNC: 111 U/L (ref 39–117)
ALT SERPL W P-5'-P-CCNC: 18 U/L (ref 1–33)
ANION GAP SERPL CALCULATED.3IONS-SCNC: 12.6 MMOL/L (ref 5–15)
AST SERPL-CCNC: 21 U/L (ref 1–32)
BASOPHILS # BLD AUTO: 0.06 10*3/MM3 (ref 0–0.2)
BASOPHILS NFR BLD AUTO: 0.8 % (ref 0–1.5)
BILIRUB SERPL-MCNC: 0.3 MG/DL (ref 0–1.2)
BUN SERPL-MCNC: 14 MG/DL (ref 6–20)
BUN/CREAT SERPL: 17.7 (ref 7–25)
CALCIUM SPEC-SCNC: 9.5 MG/DL (ref 8.6–10.5)
CHLORIDE SERPL-SCNC: 109 MMOL/L (ref 98–107)
CHOLEST SERPL-MCNC: 163 MG/DL (ref 0–200)
CO2 SERPL-SCNC: 26.4 MMOL/L (ref 22–29)
CREAT SERPL-MCNC: 0.79 MG/DL (ref 0.57–1)
DEPRECATED RDW RBC AUTO: 43 FL (ref 37–54)
EGFRCR SERPLBLD CKD-EPI 2021: 91.3 ML/MIN/1.73
EOSINOPHIL # BLD AUTO: 0.44 10*3/MM3 (ref 0–0.4)
EOSINOPHIL NFR BLD AUTO: 6.1 % (ref 0.3–6.2)
ERYTHROCYTE [DISTWIDTH] IN BLOOD BY AUTOMATED COUNT: 13.4 % (ref 12.3–15.4)
GLOBULIN UR ELPH-MCNC: 2.6 GM/DL
GLUCOSE SERPL-MCNC: 99 MG/DL (ref 65–99)
HBA1C MFR BLD: 5.7 % (ref 4.8–5.6)
HCT VFR BLD AUTO: 41 % (ref 34–46.6)
HDLC SERPL-MCNC: 58 MG/DL (ref 40–60)
HGB BLD-MCNC: 14.3 G/DL (ref 12–15.9)
IMM GRANULOCYTES # BLD AUTO: 0.01 10*3/MM3 (ref 0–0.05)
IMM GRANULOCYTES NFR BLD AUTO: 0.1 % (ref 0–0.5)
IRON 24H UR-MRATE: 98 MCG/DL (ref 37–145)
LDLC SERPL CALC-MCNC: 82 MG/DL (ref 0–100)
LDLC/HDLC SERPL: 1.37 {RATIO}
LYMPHOCYTES # BLD AUTO: 1.7 10*3/MM3 (ref 0.7–3.1)
LYMPHOCYTES NFR BLD AUTO: 23.6 % (ref 19.6–45.3)
MCH RBC QN AUTO: 30.8 PG (ref 26.6–33)
MCHC RBC AUTO-ENTMCNC: 34.9 G/DL (ref 31.5–35.7)
MCV RBC AUTO: 88.4 FL (ref 79–97)
MONOCYTES # BLD AUTO: 0.52 10*3/MM3 (ref 0.1–0.9)
MONOCYTES NFR BLD AUTO: 7.2 % (ref 5–12)
NEUTROPHILS NFR BLD AUTO: 4.47 10*3/MM3 (ref 1.7–7)
NEUTROPHILS NFR BLD AUTO: 62.2 % (ref 42.7–76)
NRBC BLD AUTO-RTO: 0 /100 WBC (ref 0–0.2)
PLATELET # BLD AUTO: 263 10*3/MM3 (ref 140–450)
PMV BLD AUTO: 11.3 FL (ref 6–12)
POTASSIUM SERPL-SCNC: 4.2 MMOL/L (ref 3.5–5.2)
PROT SERPL-MCNC: 6.7 G/DL (ref 6–8.5)
RBC # BLD AUTO: 4.64 10*6/MM3 (ref 3.77–5.28)
SODIUM SERPL-SCNC: 148 MMOL/L (ref 136–145)
TRIGL SERPL-MCNC: 129 MG/DL (ref 0–150)
TSH SERPL DL<=0.05 MIU/L-ACNC: 1.4 UIU/ML (ref 0.27–4.2)
VIT B12 BLD-MCNC: 618 PG/ML (ref 211–946)
VLDLC SERPL-MCNC: 23 MG/DL (ref 5–40)
WBC NRBC COR # BLD: 7.2 10*3/MM3 (ref 3.4–10.8)

## 2023-08-04 PROCEDURE — 80061 LIPID PANEL: CPT | Performed by: NURSE PRACTITIONER

## 2023-08-04 PROCEDURE — 82607 VITAMIN B-12: CPT | Performed by: NURSE PRACTITIONER

## 2023-08-04 PROCEDURE — 83036 HEMOGLOBIN GLYCOSYLATED A1C: CPT | Performed by: NURSE PRACTITIONER

## 2023-08-04 PROCEDURE — 80050 GENERAL HEALTH PANEL: CPT | Performed by: NURSE PRACTITIONER

## 2023-08-04 PROCEDURE — 83540 ASSAY OF IRON: CPT | Performed by: NURSE PRACTITIONER

## 2023-08-04 PROCEDURE — 82306 VITAMIN D 25 HYDROXY: CPT | Performed by: NURSE PRACTITIONER

## 2023-10-09 ENCOUNTER — E-VISIT (OUTPATIENT)
Dept: ADMINISTRATIVE | Facility: OTHER | Age: 48
End: 2023-10-09
Payer: COMMERCIAL

## 2023-10-09 NOTE — E-VISIT ESCALATED
Chief Complaint: Low back pain   Patient was shown the following escalation message:   Some conditions need a visit with a healthcare provider   Because you've had low back pain for more than 6 weeks, you should speak with a provider to get care.   ----------   Patient Interview Transcript:   Where on your lower back do you feel pain? This interview treats low back pain only. Select one.    Left side   Not selected:    Right side    Both sides    Along the spine or bony part of my back   Since your back pain started, have you had any of these stomach- or bladder-related symptoms? Select all that apply.    None of these   Not selected:    Abdominal pain or discomfort    Nausea    Vomiting    Pain that's worse after eating    Pelvic or lower abdominal pain    Burning with urination    Frequent urination    Blood in your urine   Do any of these statements apply to you? Select all that apply.    None of the above   Not selected:    I've been taking oral steroids such as prednisone for a long time    I have a bruise or scrape on my spine where the pain is    I have osteoporosis   How long have you had your current episode of back pain? Select one.    More than 6 weeks   Not selected:    Less than 1 week    1 to 2 weeks    3 to 4 weeks    5 to 6 weeks   What does the pain feel like? Select one.    Dull or aching   Not selected:    Sharp, shooting, stabbing, or burning    Other (please describe)   How severe is your back pain? Think about how the pain affects your everyday activities. On a scale of 1 to 10, for example, how difficult is it to get out of bed, get dressed, shower, or go to work or school? Select one.    Mild, 1 to 3; my pain is noticeable and distracting, but I am still able to do everyday activities   Not selected:    Moderate, 4 to 6; my pain is strong, and it makes everyday activities uncomfortable    Severe, 7 to 9; my pain is distressing, and it limits me from doing some everyday activities     Unbearable, 10+; my pain is so intense that it keeps me from doing almost all everyday activities   Does the pain travel down from your lower back to your buttock, thigh, lower leg, or foot? Select one.    Yes, it travels down my left side   Not selected:    Yes, it travels down my right side    Yes, it travels down both sides    No   Which figure best matches the pattern of your pain? Your pain may or may not travel all the way down to the foot. Choose the figure that best matches the areas where you're having pain.    None of these   Not selected:    Figure 1    Figure 2    Figure 3   Since your back pain started, have you had numbness in the areas shown on these figures? Your numbness may or may not travel all the way down to the foot. Choose the figure that best matches the area where you're having numbness.    I don't have any numbness   Not selected:    Figure 1    Figure 2    Figure 3    My numbness doesn't match any of these   Does your back pain get worse at night or when you're lying down? Select one.    Yes   Not selected:    No   Do any of these make your back pain worse? Select all that apply.    Being on my feet for a long time    Physical activity or exercise    Twisting to the side    Leaning to the side   Not selected:    Sitting in one position for a long time    Bending over    Standing up from a bent over position    Coughing or sneezing    None of the above   Do any of these help your back feel better? Select all that apply.    None of the above   Not selected:    Resting or lying down    Frequently changing positions    Physical activity or exercise    Stretching   In the last 2 weeks, have you experienced any of these? Select all that apply.    None of these   Not selected:    Dressed more slowly than usual because of back pain    Walked only short distances because of back pain    Experienced pain in the shoulder or neck area   Since your back pain started, have you stumbled or fallen  because of problems with balance or coordination? Select one.    No   Not selected:    Yes    I have an underlying condition that prevents me from standing or walking    I have an underlying condition that causes problems with my balance or coordination   Along with your back pain, have you noticed a loss of strength in your legs? Select one.    Yes, but only in one leg   Not selected:    Yes, in both legs    No    I have limited or no strength in my legs because of a chronic underlying condition   OK. Is the leg weakness getting worse? Select one.    Yes   Not selected:    No   Since your back pain started, have you had any of these symptoms? Back pain doesn't usually come with other symptoms that affect your whole body. Select all that apply.    None of these   Not selected:    Unexplained fever    Severe fatigue that doesn't improve with rest    Unintentional weight loss    Drenching night sweats   Since your back pain began, have you noticed any loss of bowel or bladder function? This includes urinating or having a bowel movement when you didn't mean to. It also includes feeling like you can't urinate or empty your bladder all the way. Select one.    No   Not selected:    Yes   Do you have a rash that looks like a stripe of blisters in the same area as your back pain? Select one.    No   Not selected:    Yes   In the last 2 weeks, have you had any of these thoughts or feelings related to your low back pain? Select all that apply.    None of these   Not selected:    Had worrying thoughts    Felt little enjoyment in activities you usually enjoy    [place] that your back pain is terrible and will never get any better    [place] that your back pain affects your mood and can limit your activities    Thought that it's unsafe to be physically active   Can you squat down and then rise to a standing position?    Yes, but my left leg seems weaker than my right leg   Not selected:    Yes, with ease    Yes, but my right leg seems  "weaker than my left leg    No, my right leg is too weak    No, my left leg is too weak    No, both my legs are too weak   Can you walk on your heels for at least 10 steps?    Yes, with ease   Not selected:    Yes, but it's hard to keep my right toes up    Yes, but it's hard to keep my left toes up    No, because I can't keep my right toes up high enough    No, because I can't keep my left toes up high enough    No, because I can't keep my toes up on both the right and left sides   Can you walk on your toes for at least 10 steps?    Yes, with ease   Not selected:    Yes, but it's harder to keep my right heel up    Yes, but it's harder to keep my left heel up    No, because I can't keep my right heel up high enough    No, because I can't keep my left heel up high enough    No, because I can't keep my heels up on both my right and left sides   The next few questions will ask you about what may have caused your back pain. Was your back pain triggered by any of these activities? Select all that apply.    Bending over    A twisting motion   Not selected:    Heavy lifting    A new sport or activity    An awkward position    Intense exercise    Another activity (specify)    No, not that I'm aware of   Is your back pain the result of a serious injury, fall, or accident? A serious injury or fall might include falling off a ladder more than 10 feet high or being involved in a car accident more serious than a \"fender perrin.\" Select one.    No   Not selected:    Yes   Is your back pain work-related? This includes injuries suffered at work and symptoms caused by repetitive activities at work (such as overuse injuries). Select one.    No   Not selected:    Yes   Within the last year, have you had any medical procedures done on your spine or back? Examples include back or spine surgeries, spinal injections, epidural injections, and epidural catheter placement. Select one.    Yes   Not selected:    No   How long ago was the spine " or back procedure done? Select one.    More than 3 months ago   Not selected:    Within the last month    1 to 3 months ago   Have you recently been treated for bacteremia, sepsis, or endocarditis? Bacteremia is a condition where bacteria is found in the blood. Sepsis can be a serious, life-threatening complication of bacteremia. Endocarditis is an infection of the lining of the heart and heart valves. Select one.    No   Not selected:    Yes   Have you had hemodialysis within the last 2 months? Hemodialysis (also known as dialysis) is a treatment for kidney failure. Select one.    No   Not selected:    Yes   In the last 6 months, have you used any injection drugs, such as heroin, cocaine, crystal meth, amphetamines, or opiates? Using injection drugs can put you at risk for serious infections of the spine and surrounding tissues. Your response to this question will only be shared with your healthcare provider. Select one.    No   Not selected:    Yes   Have you had low back pain before? Select one.    No   Not selected:    Yes, and it felt similar    Yes, but it felt different   ----------   Medical history   Medical history data does not currently exist for this patient.